# Patient Record
Sex: MALE | Race: ASIAN | Employment: FULL TIME | URBAN - METROPOLITAN AREA
[De-identification: names, ages, dates, MRNs, and addresses within clinical notes are randomized per-mention and may not be internally consistent; named-entity substitution may affect disease eponyms.]

---

## 2017-03-21 RX ORDER — LISINOPRIL 20 MG/1
TABLET ORAL
Qty: 30 TABLET | Refills: 1 | Status: SHIPPED | OUTPATIENT
Start: 2017-03-21 | End: 2017-05-01

## 2017-05-01 ENCOUNTER — OFFICE VISIT (OUTPATIENT)
Dept: INTERNAL MEDICINE CLINIC | Facility: CLINIC | Age: 37
End: 2017-05-01

## 2017-05-01 VITALS
BODY MASS INDEX: 29 KG/M2 | TEMPERATURE: 98 F | WEIGHT: 173.5 LBS | HEART RATE: 61 BPM | DIASTOLIC BLOOD PRESSURE: 80 MMHG | OXYGEN SATURATION: 98 % | SYSTOLIC BLOOD PRESSURE: 116 MMHG

## 2017-05-01 DIAGNOSIS — E78.2 ELEVATED TRIGLYCERIDES WITH HIGH CHOLESTEROL: ICD-10-CM

## 2017-05-01 DIAGNOSIS — J01.80 OTHER ACUTE SINUSITIS, RECURRENCE NOT SPECIFIED: Primary | ICD-10-CM

## 2017-05-01 DIAGNOSIS — I10 ESSENTIAL HYPERTENSION: ICD-10-CM

## 2017-05-01 DIAGNOSIS — E78.00 PURE HYPERCHOLESTEROLEMIA: ICD-10-CM

## 2017-05-01 PROCEDURE — 99214 OFFICE O/P EST MOD 30 MIN: CPT | Performed by: FAMILY MEDICINE

## 2017-05-01 RX ORDER — LISINOPRIL 20 MG/1
TABLET ORAL
Qty: 30 TABLET | Refills: 1 | Status: SHIPPED | OUTPATIENT
Start: 2017-05-01 | End: 2017-06-26

## 2017-05-01 RX ORDER — AZITHROMYCIN 250 MG/1
TABLET, FILM COATED ORAL
Qty: 6 TABLET | Refills: 0 | Status: SHIPPED | OUTPATIENT
Start: 2017-05-01 | End: 2017-07-28 | Stop reason: ALTCHOICE

## 2017-05-01 NOTE — PROGRESS NOTES
CHIEF COMPLAINT:   Patient presents with:  Cough/URI: x 1 week. HPI:   Rafael Arreola is a 40year old male who presents for upper respiratory symptoms for  1 weeks.  Patient reports sore throat, congestion, yellow colored nasal discharge, cough with /80 mmHg  Pulse 61  Temp(Src) 98.3 °F (36.8 °C) (Oral)  Wt 173 lb 8 oz  SpO2 98%  GENERAL: well developed, well nourished,in no apparent distress  SKIN: no rashes,no suspicious lesions  HEAD: atraumatic, normocephalic.   + pressure on palpation of m cool-mist vaporizer and prop themselves up at night to help with the congestion.    - azithromycin (ZITHROMAX Z-LD) 250 MG Oral Tab; Take two tablets by mouth today, then one tablet daily. Dispense: 6 tablet; Refill: 0    4.  Elevated triglycerides with h

## 2017-06-26 ENCOUNTER — TELEPHONE (OUTPATIENT)
Dept: INTERNAL MEDICINE CLINIC | Facility: CLINIC | Age: 37
End: 2017-06-26

## 2017-06-26 DIAGNOSIS — I10 ESSENTIAL HYPERTENSION: ICD-10-CM

## 2017-06-26 RX ORDER — LISINOPRIL 20 MG/1
TABLET ORAL
Qty: 90 TABLET | Refills: 0 | Status: SHIPPED | OUTPATIENT
Start: 2017-06-26 | End: 2017-08-19

## 2017-07-28 ENCOUNTER — OFFICE VISIT (OUTPATIENT)
Dept: INTERNAL MEDICINE CLINIC | Facility: CLINIC | Age: 37
End: 2017-07-28

## 2017-07-28 VITALS
HEIGHT: 64.5 IN | RESPIRATION RATE: 16 BRPM | WEIGHT: 169 LBS | TEMPERATURE: 99 F | HEART RATE: 68 BPM | SYSTOLIC BLOOD PRESSURE: 126 MMHG | BODY MASS INDEX: 28.5 KG/M2 | DIASTOLIC BLOOD PRESSURE: 78 MMHG

## 2017-07-28 DIAGNOSIS — I10 ESSENTIAL HYPERTENSION: Primary | ICD-10-CM

## 2017-07-28 DIAGNOSIS — E78.00 PURE HYPERCHOLESTEROLEMIA: ICD-10-CM

## 2017-07-28 PROCEDURE — 99213 OFFICE O/P EST LOW 20 MIN: CPT | Performed by: FAMILY MEDICINE

## 2017-07-28 NOTE — PROGRESS NOTES
HPI:    Patient ID: Sandy Parra is a 40year old male. HPI  Sandy Parra is a 40year old male. HPI:   Patient presents for recheck of his hypertension.  Pt has been taking medications as instructed, no medication side effects, no home BP geoff Alcohol use: Yes           0.0 - 1.8 oz/week     Standard drinks or equivalent: 0 - 3 per week        REVIEW OF SYSTEMS:   GENERAL HEALTH: feels well otherwise  RESPIRATORY: denies shortness of breath with exertion  CARDIOVASCULAR: denies ch

## 2017-08-19 DIAGNOSIS — I10 ESSENTIAL HYPERTENSION: ICD-10-CM

## 2017-08-21 RX ORDER — LISINOPRIL 20 MG/1
TABLET ORAL
Qty: 90 TABLET | Refills: 0 | Status: SHIPPED | OUTPATIENT
Start: 2017-08-21 | End: 2017-10-26

## 2017-10-26 ENCOUNTER — TELEPHONE (OUTPATIENT)
Dept: INTERNAL MEDICINE CLINIC | Facility: CLINIC | Age: 37
End: 2017-10-26

## 2017-10-26 DIAGNOSIS — I10 ESSENTIAL HYPERTENSION: ICD-10-CM

## 2017-10-26 RX ORDER — LISINOPRIL 20 MG/1
20 TABLET ORAL
Qty: 90 TABLET | Refills: 0 | Status: SHIPPED | OUTPATIENT
Start: 2017-10-26 | End: 2018-02-06

## 2017-12-29 ENCOUNTER — TELEPHONE (OUTPATIENT)
Dept: INTERNAL MEDICINE CLINIC | Facility: CLINIC | Age: 37
End: 2017-12-29

## 2018-01-29 ENCOUNTER — OFFICE VISIT (OUTPATIENT)
Dept: INTERNAL MEDICINE CLINIC | Facility: CLINIC | Age: 38
End: 2018-01-29

## 2018-01-29 VITALS
OXYGEN SATURATION: 98 % | BODY MASS INDEX: 28.96 KG/M2 | RESPIRATION RATE: 16 BRPM | DIASTOLIC BLOOD PRESSURE: 78 MMHG | TEMPERATURE: 99 F | HEIGHT: 64.5 IN | SYSTOLIC BLOOD PRESSURE: 118 MMHG | WEIGHT: 171.75 LBS | HEART RATE: 76 BPM

## 2018-01-29 DIAGNOSIS — E78.2 MIXED HYPERLIPIDEMIA: ICD-10-CM

## 2018-01-29 DIAGNOSIS — F51.01 PRIMARY INSOMNIA: ICD-10-CM

## 2018-01-29 DIAGNOSIS — I10 ESSENTIAL HYPERTENSION: ICD-10-CM

## 2018-01-29 DIAGNOSIS — J06.9 VIRAL UPPER RESPIRATORY TRACT INFECTION: Primary | ICD-10-CM

## 2018-01-29 PROCEDURE — 99214 OFFICE O/P EST MOD 30 MIN: CPT | Performed by: INTERNAL MEDICINE

## 2018-01-29 RX ORDER — BENZONATATE 100 MG/1
100 CAPSULE ORAL 3 TIMES DAILY PRN
Qty: 30 CAPSULE | Refills: 1 | Status: SHIPPED | OUTPATIENT
Start: 2018-01-29 | End: 2018-04-04 | Stop reason: ALTCHOICE

## 2018-01-30 PROBLEM — F51.01 PRIMARY INSOMNIA: Status: ACTIVE | Noted: 2018-01-30

## 2018-01-30 NOTE — PROGRESS NOTES
Wynetta Spurling is a 41year old male. HPI:   Patient presents with:  Cough: Est Pt. c/c cough and lack of sleep  Patient presents with persistent cough. Persisting on and off for past several weeks.     Has been taking OTC medications with minimal rel smokeless tobacco. He reports that he drinks alcohol. He reports that he uses drugs, including Cannabis.   Wt Readings from Last 6 Encounters:  01/29/18 : 171 lb 12 oz  07/28/17 : 169 lb  05/01/17 : 173 lb 8 oz  12/08/16 : 175 lb  11/10/16 : 177 lb  10/06/1

## 2018-01-30 NOTE — PATIENT INSTRUCTIONS
- Start cough suppressant capsules (benzonatate). Take 1 capsule every 8 hours as needed for cough  - Start nightly melatonin for sleep. Take at least 1-2 mg every night for the next few weeks. Let us know if this does not help with your sleep.   - Get b

## 2018-02-06 ENCOUNTER — TELEPHONE (OUTPATIENT)
Dept: INTERNAL MEDICINE CLINIC | Facility: CLINIC | Age: 38
End: 2018-02-06

## 2018-02-06 DIAGNOSIS — I10 ESSENTIAL HYPERTENSION: ICD-10-CM

## 2018-02-06 RX ORDER — LISINOPRIL 20 MG/1
20 TABLET ORAL DAILY
Qty: 90 TABLET | Refills: 1 | Status: SHIPPED | OUTPATIENT
Start: 2018-02-06 | End: 2018-10-15

## 2018-04-02 ENCOUNTER — APPOINTMENT (OUTPATIENT)
Dept: LAB | Age: 38
End: 2018-04-02
Attending: FAMILY MEDICINE
Payer: COMMERCIAL

## 2018-04-02 DIAGNOSIS — E78.00 PURE HYPERCHOLESTEROLEMIA: ICD-10-CM

## 2018-04-02 DIAGNOSIS — E78.2 ELEVATED TRIGLYCERIDES WITH HIGH CHOLESTEROL: ICD-10-CM

## 2018-04-02 DIAGNOSIS — I10 ESSENTIAL HYPERTENSION: ICD-10-CM

## 2018-04-02 PROCEDURE — 83036 HEMOGLOBIN GLYCOSYLATED A1C: CPT

## 2018-04-02 PROCEDURE — 80061 LIPID PANEL: CPT

## 2018-04-02 PROCEDURE — 80053 COMPREHEN METABOLIC PANEL: CPT

## 2018-04-02 PROCEDURE — 36415 COLL VENOUS BLD VENIPUNCTURE: CPT

## 2018-04-04 ENCOUNTER — OFFICE VISIT (OUTPATIENT)
Dept: INTERNAL MEDICINE CLINIC | Facility: CLINIC | Age: 38
End: 2018-04-04

## 2018-04-04 VITALS
BODY MASS INDEX: 28.84 KG/M2 | HEIGHT: 64.75 IN | HEART RATE: 78 BPM | SYSTOLIC BLOOD PRESSURE: 118 MMHG | TEMPERATURE: 98 F | WEIGHT: 171 LBS | RESPIRATION RATE: 16 BRPM | OXYGEN SATURATION: 99 % | DIASTOLIC BLOOD PRESSURE: 68 MMHG

## 2018-04-04 DIAGNOSIS — F51.01 PRIMARY INSOMNIA: ICD-10-CM

## 2018-04-04 DIAGNOSIS — Z00.00 LABORATORY EXAM ORDERED AS PART OF ROUTINE GENERAL MEDICAL EXAMINATION: ICD-10-CM

## 2018-04-04 DIAGNOSIS — R07.9 CHEST PAIN, UNSPECIFIED TYPE: ICD-10-CM

## 2018-04-04 DIAGNOSIS — Z00.00 WELLNESS EXAMINATION: Primary | ICD-10-CM

## 2018-04-04 PROCEDURE — 99213 OFFICE O/P EST LOW 20 MIN: CPT | Performed by: FAMILY MEDICINE

## 2018-04-04 PROCEDURE — 99395 PREV VISIT EST AGE 18-39: CPT | Performed by: FAMILY MEDICINE

## 2018-04-04 RX ORDER — TEMAZEPAM 15 MG/1
15 CAPSULE ORAL NIGHTLY PRN
Qty: 30 CAPSULE | Refills: 0 | Status: SHIPPED | OUTPATIENT
Start: 2018-04-04 | End: 2018-04-24

## 2018-04-04 NOTE — PROGRESS NOTES
HPI:    Patient ID: Ezio Officer is a 41year old male.     HPI  Ezio Officer is a 41year old male who presents for a complete physical exam.   HPI:       Wt Readings from Last 6 Encounters:  04/04/18 : 171 lb  01/29/18 : 171 lb 12 oz  07/28/17 : 169 Years: 0.00      Smokeless tobacco: Never Used                      Alcohol use: Yes           0.0 - 1.8 oz/week     Standard drinks or equivalent: 0 - 3 per week     .    Exercise: minimal.  Diet: watches minimally     REVIEW OF SYSTEMS:   GENERAL: feels w Silvino Gorodn is a 41year old male who presents for a complete physical exam.  Discussed his sleep  Suspect more anxiety related   Will try restoril prn   Discussed his CP   Suspect stress realted as well    Ok to exercise   Not cardiac    Discussed h

## 2018-04-24 ENCOUNTER — OFFICE VISIT (OUTPATIENT)
Dept: INTERNAL MEDICINE CLINIC | Facility: CLINIC | Age: 38
End: 2018-04-24

## 2018-04-24 VITALS
HEART RATE: 68 BPM | OXYGEN SATURATION: 99 % | DIASTOLIC BLOOD PRESSURE: 80 MMHG | TEMPERATURE: 99 F | BODY MASS INDEX: 29 KG/M2 | WEIGHT: 173 LBS | SYSTOLIC BLOOD PRESSURE: 126 MMHG

## 2018-04-24 DIAGNOSIS — G47.00 INSOMNIA, UNSPECIFIED TYPE: ICD-10-CM

## 2018-04-24 DIAGNOSIS — H04.123 CHRONIC DRYNESS OF BOTH EYES: ICD-10-CM

## 2018-04-24 DIAGNOSIS — R06.83 SNORING: Primary | ICD-10-CM

## 2018-04-24 PROCEDURE — 99213 OFFICE O/P EST LOW 20 MIN: CPT | Performed by: NURSE PRACTITIONER

## 2018-04-24 NOTE — PROGRESS NOTES
CHIEF COMPLAINT:     Patient presents with:  Eye Pain: Bilateral eye pain intermittent x 5 years associated with dry, \"numb, and burning\" eyes in the morning.        HPI:   Vanessa Clark is a 41year old male who presents with bilateral eye pain and swe vision, + intermittent blurred vision, gritty sensation, chronic red eyes, some light sensitivity   HENT: Denies congestion, rhinorrhea, sore throat or ear pain  CHEST: Denies chest pain, or palpitations  LUNGS: Denies shortness of breath, cough, or wheezi improvement in symptoms.

## 2018-05-24 RX ORDER — LISINOPRIL 20 MG/1
TABLET ORAL
Qty: 30 TABLET | Refills: 0 | Status: SHIPPED | OUTPATIENT
Start: 2018-05-24 | End: 2019-01-17

## 2018-05-24 NOTE — TELEPHONE ENCOUNTER
Last filled to [facility] 2/7/18 #90 with 1 refill. Pt has not received mail order Rx and is out of medication.  Would like small amount to get him through till order arrives

## 2018-06-13 ENCOUNTER — OFFICE VISIT (OUTPATIENT)
Dept: PULMONOLOGY | Facility: CLINIC | Age: 38
End: 2018-06-13

## 2018-06-13 VITALS
RESPIRATION RATE: 18 BRPM | HEART RATE: 64 BPM | OXYGEN SATURATION: 98 % | BODY MASS INDEX: 28.28 KG/M2 | DIASTOLIC BLOOD PRESSURE: 75 MMHG | SYSTOLIC BLOOD PRESSURE: 121 MMHG | HEIGHT: 65.5 IN | WEIGHT: 171.81 LBS

## 2018-06-13 DIAGNOSIS — G47.00 INSOMNIA, UNSPECIFIED TYPE: ICD-10-CM

## 2018-06-13 DIAGNOSIS — G47.33 OSA (OBSTRUCTIVE SLEEP APNEA): Primary | ICD-10-CM

## 2018-06-13 PROCEDURE — 99212 OFFICE O/P EST SF 10 MIN: CPT | Performed by: INTERNAL MEDICINE

## 2018-06-13 PROCEDURE — 99244 OFF/OP CNSLTJ NEW/EST MOD 40: CPT | Performed by: INTERNAL MEDICINE

## 2018-06-13 NOTE — PATIENT INSTRUCTIONS
Please await a call from Nicolas Lao regarding authorization of your sleep study, if you do not hear from them after 2 business days you can call them directly at [phone] to check status.

## 2018-06-13 NOTE — PROGRESS NOTES
[facility] OFFICE BUILDING, [facility], [facility]    Report of Consultation    Wynetta Spurling Patient Status:  Outpatient    1/10/1980 MRN [id number]   Location [facility] BUILDING, [address] [place] [address], [facility] (Not in a hospital admission)    Allergies  No Known Allergies    Review of Systems:    Pertinent items are noted in HPI. Physical Exam:   Blood pressure 121/75, pulse 64, resp.  rate 18, height 5' 5.5\" (1.664 m), weight 171 lb 12.8 oz (77.9 kg), Sp

## 2018-06-29 ENCOUNTER — TELEPHONE (OUTPATIENT)
Dept: PULMONOLOGY | Facility: CLINIC | Age: 38
End: 2018-06-29

## 2018-06-29 DIAGNOSIS — G47.33 OSA (OBSTRUCTIVE SLEEP APNEA): Primary | ICD-10-CM

## 2018-06-29 NOTE — TELEPHONE ENCOUNTER
Insurance denying in lab sleep study, AR if you are ok w/ home sleep study sign the pended order. Thanks.

## 2018-07-02 NOTE — TELEPHONE ENCOUNTER
Dr. Roberto England signed referral for home sleep study, message routed to [facility] to double check approval and contact patient re: authorization.

## 2018-08-07 ENCOUNTER — OFFICE VISIT (OUTPATIENT)
Dept: SLEEP CENTER | Age: 38
End: 2018-08-07
Attending: INTERNAL MEDICINE
Payer: COMMERCIAL

## 2018-08-07 DIAGNOSIS — G47.33 OSA (OBSTRUCTIVE SLEEP APNEA): ICD-10-CM

## 2018-08-07 PROCEDURE — 95806 SLEEP STUDY UNATT&RESP EFFT: CPT

## 2018-08-09 NOTE — PROCEDURES
[facility]  Accredited by the [employer] ([facility])    PATIENT'S NAME: Tigre Sheth   ATTENDING PHYSICIAN: Katie Cornejo Arm, MD   REFERRING PHYSICIAN: Katie Cornejo Arm, MD   PATIENT ACCOUNT #: [de-identified] LOCATION: [facility] CPAP titration in this symptomatic patient. Alternatively, as the apnea plus hypopnea index is only mildly elevated, could consider oral appliance therapy. 2.   Weight loss. 3.   Avoid alcohol. 4.   Avoid sedating drug.   5.   Patient should not drive

## 2018-08-24 ENCOUNTER — TELEPHONE (OUTPATIENT)
Dept: PULMONOLOGY | Facility: CLINIC | Age: 38
End: 2018-08-24

## 2018-08-24 DIAGNOSIS — G47.33 OSA (OBSTRUCTIVE SLEEP APNEA): Primary | ICD-10-CM

## 2018-08-24 NOTE — TELEPHONE ENCOUNTER
Pt insurance will allow for autotpap machine. However Pt wants to know of alternatives other than cpap therapy for the mild LEROY. Pt is not interested in wearing a cpap mask. Pt wants to know if loosing weight is an option. Please advise.

## 2018-08-24 NOTE — TELEPHONE ENCOUNTER
----- Message from Kiran Armenta MD sent at 8/21/2018  1:28 PM CDT -----  Please inform the patient that his sleep study showed mild LEROY  Referred the patient for CPAP titration sleep study

## 2018-08-28 NOTE — TELEPHONE ENCOUNTER
Sleep study showed mild obstructive sleep apnea  I think it is reasonable to refer the patient for oral appliances if he is not willing to use a CPAP  Please refer patient for oral appliances

## 2018-08-29 NOTE — TELEPHONE ENCOUNTER
Pt informed of below. Pt stts he wants to try the auto cpap machine first. Dr Mundo Bishop please place the order for the auto pap machine. Thank you.

## 2018-09-07 NOTE — TELEPHONE ENCOUNTER
Spoke with pt informed him auto cpap order will be sent to [facility]. Pt was provided with number and also informed to F/U with  30-90 days prior to starting cpap.  Pt stated he will cancel upcoming magda for 9/19/18 and will make magda cpap F/U once he star

## 2018-09-10 NOTE — PROGRESS NOTES
Patient presents with: Anxiety: since Friday (9/7/18). Insomnia: since Friday (9/7/18). Headache: intense headache this morning. Saturday c/o headache (9/8/18). Cold chills, no appetite, not eating, tired since Friday (9/7/18).         HPI:  The patien Medications:  DiphenhydrAMINE HCl (ALLERGY MED OR) Take 1 tablet by mouth daily. Disp:  Rfl:    ALPRAZolam (XANAX) 0.25 MG Oral Tab Take 1 tablet (0.25 mg total) by mouth nightly as needed for Sleep.  Disp: 30 tablet Rfl: 0   LISINOPRIL 20 MG Oral Tab TAKE Disp Refills   • ALPRAZolam (XANAX) 0.25 MG Oral Tab 30 tablet 0     Sig: Take 1 tablet (0.25 mg total) by mouth nightly as needed for Sleep. Imaging & Consults:  None    Anxiety- no suicidal or homicidal thoughts.   Long discussion about pathophysiol

## 2018-10-10 ENCOUNTER — TELEPHONE (OUTPATIENT)
Dept: INTERNAL MEDICINE CLINIC | Facility: CLINIC | Age: 38
End: 2018-10-10

## 2018-10-10 RX ORDER — ALPRAZOLAM 0.25 MG/1
0.25 TABLET ORAL NIGHTLY PRN
Qty: 30 TABLET | Refills: 0 | Status: CANCELLED | OUTPATIENT
Start: 2018-10-10

## 2018-10-10 NOTE — TELEPHONE ENCOUNTER
Has upcoming appt on 10/15/18 with SM Will run out of alprazolam before appt.  Medication helped him with sleep and requesting refill or alternative med if unable to be refilled Will discuss at [address] ,Taking half tablet now trying to wean off but also started

## 2018-10-10 NOTE — TELEPHONE ENCOUNTER
Pt called for call back from nurse to discuss refill of ALPRAZolam (XANAX) 0.25 MG Oral Tab. Please call to discuss.

## 2018-10-12 RX ORDER — ALPRAZOLAM 0.25 MG/1
0.25 TABLET ORAL NIGHTLY PRN
Qty: 30 TABLET | Refills: 0 | Status: SHIPPED | OUTPATIENT
Start: 2018-10-12 | End: 2018-10-15

## 2018-10-12 NOTE — TELEPHONE ENCOUNTER
Ok to refill xanax 0.25 mg one nightly at night but I am not in until Monday to refill the medication unless Gisel Caass can refill it for him since he has an appt on 10/15/18

## 2018-10-15 ENCOUNTER — OFFICE VISIT (OUTPATIENT)
Dept: INTERNAL MEDICINE CLINIC | Facility: CLINIC | Age: 38
End: 2018-10-15
Payer: COMMERCIAL

## 2018-10-15 VITALS
DIASTOLIC BLOOD PRESSURE: 74 MMHG | WEIGHT: 167.5 LBS | RESPIRATION RATE: 12 BRPM | TEMPERATURE: 99 F | SYSTOLIC BLOOD PRESSURE: 118 MMHG | BODY MASS INDEX: 27 KG/M2 | HEART RATE: 74 BPM | OXYGEN SATURATION: 98 %

## 2018-10-15 DIAGNOSIS — E78.00 PURE HYPERCHOLESTEROLEMIA: ICD-10-CM

## 2018-10-15 DIAGNOSIS — F51.01 PRIMARY INSOMNIA: ICD-10-CM

## 2018-10-15 DIAGNOSIS — F41.9 ANXIETY: ICD-10-CM

## 2018-10-15 DIAGNOSIS — I10 ESSENTIAL HYPERTENSION: Primary | ICD-10-CM

## 2018-10-15 PROCEDURE — 99214 OFFICE O/P EST MOD 30 MIN: CPT | Performed by: FAMILY MEDICINE

## 2018-10-15 RX ORDER — LISINOPRIL 20 MG/1
20 TABLET ORAL DAILY
Qty: 90 TABLET | Refills: 0 | Status: SHIPPED | OUTPATIENT
Start: 2018-10-15 | End: 2019-01-17

## 2018-10-15 RX ORDER — LORATADINE 10 MG/1
10 TABLET ORAL DAILY
COMMUNITY

## 2018-10-15 RX ORDER — ALPRAZOLAM 0.25 MG/1
0.25 TABLET ORAL NIGHTLY PRN
Qty: 30 TABLET | Refills: 0 | Status: SHIPPED | OUTPATIENT
Start: 2018-10-15 | End: 2019-01-17

## 2018-10-15 NOTE — PROGRESS NOTES
Patient presents with:  Medication Follow-Up      HPI:  Pt is here for a recheck of anxiety. Has been tolerating the meds well. Denies any side effects from the meds. Mood has been good. Patient's appetite is good. Pt denies headaches,tics,or insomnia.  Pt h Outpatient Medications:  loratadine 10 MG Oral Tab Take 10 mg by mouth daily. Disp:  Rfl:    lisinopril 20 MG Oral Tab Take 1 tablet (20 mg total) by mouth daily.  Disp: 90 tablet Rfl: 0   ALPRAZolam (XANAX) 0.25 MG Oral Tab Take 1 tablet (0.25 mg total) by Keiko Menjivar) 0.25 MG Oral Tab 30 tablet 0     Sig: Take 1 tablet (0.25 mg total) by mouth nightly as needed for Sleep. Imaging & Consults:  None    1. Essential hypertension  Conservative measures dicussed. Diet and exercise explained and encouraged.   Fa

## 2018-10-16 ENCOUNTER — TELEPHONE (OUTPATIENT)
Dept: PULMONOLOGY | Facility: CLINIC | Age: 38
End: 2018-10-16

## 2018-10-16 DIAGNOSIS — G47.33 OSA (OBSTRUCTIVE SLEEP APNEA): Primary | ICD-10-CM

## 2018-10-17 NOTE — TELEPHONE ENCOUNTER
Pt states he does not want to use the CPAP machine as he is not comfortable using it. Pt would like referral for oral appliance. Dr. Selam Guido, ok to D/C CPAP machine and refer to [address] [place] [address] for oral appliance?

## 2018-10-18 NOTE — TELEPHONE ENCOUNTER
Referral to [address] [place] [address] faxed to [phone]. Fax confirmation received. [facility] of [facility] phone # [phone]. Referral sent to HIM for scanning.

## 2018-10-19 NOTE — TELEPHONE ENCOUNTER
D/C CPAP order faxed to [facility] [phone] and left for Yo Cali [facility] to . Fax confirmation received.

## 2018-11-01 ENCOUNTER — TELEPHONE (OUTPATIENT)
Dept: INTERNAL MEDICINE CLINIC | Facility: CLINIC | Age: 38
End: 2018-11-01

## 2018-11-01 NOTE — TELEPHONE ENCOUNTER
Received a  from the [address] [place] [address] for treatment and evaluation for patient. Form was faxed to our Maradama Castromann referral  department.

## 2018-11-20 ENCOUNTER — LAB ENCOUNTER (OUTPATIENT)
Dept: LAB | Age: 38
End: 2018-11-20
Attending: FAMILY MEDICINE
Payer: COMMERCIAL

## 2018-11-20 ENCOUNTER — TELEPHONE (OUTPATIENT)
Dept: INTERNAL MEDICINE CLINIC | Facility: CLINIC | Age: 38
End: 2018-11-20

## 2018-11-20 ENCOUNTER — OFFICE VISIT (OUTPATIENT)
Dept: INTERNAL MEDICINE CLINIC | Facility: CLINIC | Age: 38
End: 2018-11-20
Payer: COMMERCIAL

## 2018-11-20 VITALS
TEMPERATURE: 99 F | DIASTOLIC BLOOD PRESSURE: 82 MMHG | WEIGHT: 166.75 LBS | HEART RATE: 67 BPM | SYSTOLIC BLOOD PRESSURE: 130 MMHG | BODY MASS INDEX: 27 KG/M2 | OXYGEN SATURATION: 98 %

## 2018-11-20 DIAGNOSIS — I10 ESSENTIAL HYPERTENSION: ICD-10-CM

## 2018-11-20 DIAGNOSIS — R68.83 CHILLS: ICD-10-CM

## 2018-11-20 DIAGNOSIS — E78.00 PURE HYPERCHOLESTEROLEMIA: ICD-10-CM

## 2018-11-20 DIAGNOSIS — R25.3 EYELID TWITCH: ICD-10-CM

## 2018-11-20 DIAGNOSIS — R68.83 CHILLS: Primary | ICD-10-CM

## 2018-11-20 DIAGNOSIS — H02.823 EYELID CYST, RIGHT: ICD-10-CM

## 2018-11-20 DIAGNOSIS — G47.33 OSA (OBSTRUCTIVE SLEEP APNEA): Primary | ICD-10-CM

## 2018-11-20 DIAGNOSIS — B34.9 VIRAL ILLNESS: ICD-10-CM

## 2018-11-20 PROCEDURE — 80053 COMPREHEN METABOLIC PANEL: CPT

## 2018-11-20 PROCEDURE — 99214 OFFICE O/P EST MOD 30 MIN: CPT | Performed by: FAMILY MEDICINE

## 2018-11-20 PROCEDURE — 36415 COLL VENOUS BLD VENIPUNCTURE: CPT

## 2018-11-20 PROCEDURE — 84443 ASSAY THYROID STIM HORMONE: CPT

## 2018-11-20 PROCEDURE — 80061 LIPID PANEL: CPT

## 2018-11-20 PROCEDURE — 84439 ASSAY OF FREE THYROXINE: CPT

## 2018-11-20 PROCEDURE — 85025 COMPLETE CBC W/AUTO DIFF WBC: CPT

## 2018-11-20 NOTE — TELEPHONE ENCOUNTER
Referral request   Received:  Today   Message Contents   Sherif Olvera [facility] Clinical Staff   Cc: [facility]   Phone Number: [phone]             Patient Name: Rafael Arreola   : 01/10/1980   Reason for the order/referral:

## 2018-11-20 NOTE — PROGRESS NOTES
CHIEF COMPLAINT:     Patient presents with:  Cold: has been feeling cold since yesterday. Eye Twitching: Intermittent right eye twitching x 1 week. HPI:   Gaston Ellis is a 41year old male .   Pt has been feeling cold and having chills for the p congestion, rhinorrhea, sore throat or ear pain  CHEST: Denies chest pain, or palpitations  LUNGS: Denies shortness of breath, cough, or wheezing  GI: Denies abdominal pain, N/V/C/D.   MUSCULOSKELETAL: no arthralgia or swollen joints  LYMPH:  Denies lympha plan.  The patient is asked to call with any concerns.

## 2018-11-21 ENCOUNTER — TELEPHONE (OUTPATIENT)
Dept: INTERNAL MEDICINE CLINIC | Facility: CLINIC | Age: 38
End: 2018-11-21

## 2018-11-21 DIAGNOSIS — G24.5 EYE TWITCH: ICD-10-CM

## 2018-11-21 DIAGNOSIS — H02.823 EYELID CYST, RIGHT: Primary | ICD-10-CM

## 2018-11-21 NOTE — TELEPHONE ENCOUNTER
referral   Received:  Today   Message Contents   Izabel Senior [facility] Clinical Staff   Cc: [facility] Referral Pool   Phone Number: [phone]             .Reason for the order/referral: office visit   PCP:  Dr Rhoda De Santiago   Refer to Provider (first a

## 2018-11-28 DIAGNOSIS — E78.00 PURE HYPERCHOLESTEROLEMIA: Primary | ICD-10-CM

## 2018-11-28 DIAGNOSIS — R79.89 ABNORMAL TSH: ICD-10-CM

## 2018-11-28 RX ORDER — ATORVASTATIN CALCIUM 20 MG/1
20 TABLET, FILM COATED ORAL NIGHTLY
Qty: 30 TABLET | Refills: 5 | Status: SHIPPED | OUTPATIENT
Start: 2018-11-28 | End: 2019-01-17

## 2018-11-29 ENCOUNTER — TELEPHONE (OUTPATIENT)
Dept: INTERNAL MEDICINE CLINIC | Facility: CLINIC | Age: 38
End: 2018-11-29

## 2018-11-29 NOTE — TELEPHONE ENCOUNTER
Patient is returning the nurses call in regards to the referral the sleep apnea equipment. Please advise.

## 2018-11-29 NOTE — TELEPHONE ENCOUNTER
Patient calling to be sure referral is authorized for Dr Apurva Mckeon for sleep apnea equipment; reviewed all of referral information with him and he asked a nurse call him to be sure it is authorized as he was waiting for a period of time for authorization.  Ple

## 2018-12-14 ENCOUNTER — TELEPHONE (OUTPATIENT)
Dept: INTERNAL MEDICINE CLINIC | Facility: CLINIC | Age: 38
End: 2018-12-14

## 2018-12-14 DIAGNOSIS — H02.823 CYST OF RIGHT EYELID: Primary | ICD-10-CM

## 2018-12-14 DIAGNOSIS — G24.5 EYE TWITCH: ICD-10-CM

## 2018-12-14 NOTE — TELEPHONE ENCOUNTER
Referal request   Received:  Today   Message Contents   Jermaine Hutchinson 18 Clinical Staff   Cc: [facility]   Phone Number: [phone]             Patient Name: Sandy Parra   : [birth year]   Reason for the order/referral: F/u

## 2018-12-17 NOTE — TELEPHONE ENCOUNTER
Spoke with pt and Dr Rock Reddy feels cyst on R eye lid may need surgery to be removed and that is something he does not do So he needs referral to ophthalmologist who can surgically remove cyst if needed

## 2019-01-17 ENCOUNTER — OFFICE VISIT (OUTPATIENT)
Dept: INTERNAL MEDICINE CLINIC | Facility: CLINIC | Age: 39
End: 2019-01-17
Payer: COMMERCIAL

## 2019-01-17 ENCOUNTER — TELEPHONE (OUTPATIENT)
Dept: INTERNAL MEDICINE CLINIC | Facility: CLINIC | Age: 39
End: 2019-01-17

## 2019-01-17 ENCOUNTER — APPOINTMENT (OUTPATIENT)
Dept: LAB | Age: 39
End: 2019-01-17
Attending: FAMILY MEDICINE
Payer: COMMERCIAL

## 2019-01-17 VITALS
SYSTOLIC BLOOD PRESSURE: 128 MMHG | WEIGHT: 174 LBS | OXYGEN SATURATION: 98 % | DIASTOLIC BLOOD PRESSURE: 80 MMHG | TEMPERATURE: 99 F | HEART RATE: 78 BPM | HEIGHT: 65 IN | BODY MASS INDEX: 28.99 KG/M2 | RESPIRATION RATE: 16 BRPM

## 2019-01-17 DIAGNOSIS — R79.89 ABNORMAL THYROID STIMULATING HORMONE LEVEL: ICD-10-CM

## 2019-01-17 DIAGNOSIS — E78.2 MIXED HYPERLIPIDEMIA: ICD-10-CM

## 2019-01-17 DIAGNOSIS — R79.89 ABNORMAL THYROID BLOOD TEST: ICD-10-CM

## 2019-01-17 DIAGNOSIS — F41.9 ANXIETY: ICD-10-CM

## 2019-01-17 DIAGNOSIS — E05.00 THYROID ASSOCIATED OPHTHALMOPATHY: Primary | ICD-10-CM

## 2019-01-17 DIAGNOSIS — R79.89 ABNORMAL TSH: ICD-10-CM

## 2019-01-17 DIAGNOSIS — R79.89 ABNORMAL SERUM THYROXINE (T4) LEVEL: ICD-10-CM

## 2019-01-17 DIAGNOSIS — I10 ESSENTIAL HYPERTENSION: ICD-10-CM

## 2019-01-17 DIAGNOSIS — L65.9 HAIR THINNING: Primary | ICD-10-CM

## 2019-01-17 LAB
T4 FREE SERPL-MCNC: 0.8 NG/DL (ref 0.9–1.8)
TSI SER-ACNC: 0.8 MIU/ML (ref 0.35–5.5)

## 2019-01-17 PROCEDURE — 84443 ASSAY THYROID STIM HORMONE: CPT

## 2019-01-17 PROCEDURE — 36415 COLL VENOUS BLD VENIPUNCTURE: CPT

## 2019-01-17 PROCEDURE — 99214 OFFICE O/P EST MOD 30 MIN: CPT | Performed by: FAMILY MEDICINE

## 2019-01-17 PROCEDURE — 84439 ASSAY OF FREE THYROXINE: CPT

## 2019-01-17 RX ORDER — LISINOPRIL 20 MG/1
20 TABLET ORAL DAILY
Qty: 90 TABLET | Refills: 1 | Status: SHIPPED | OUTPATIENT
Start: 2019-01-17 | End: 2019-07-18

## 2019-01-17 RX ORDER — ALPRAZOLAM 0.25 MG/1
0.25 TABLET ORAL NIGHTLY PRN
Qty: 30 TABLET | Refills: 0 | Status: SHIPPED | OUTPATIENT
Start: 2019-01-17 | End: 2019-08-01

## 2019-01-17 RX ORDER — ATORVASTATIN CALCIUM 20 MG/1
20 TABLET, FILM COATED ORAL NIGHTLY
Qty: 90 TABLET | Refills: 1 | Status: SHIPPED | OUTPATIENT
Start: 2019-01-17 | End: 2019-06-05

## 2019-01-17 NOTE — TELEPHONE ENCOUNTER
Please let Pt know his CT of the orbits is ordered but has to be approved? ?  But I'm not sure, please check this out .  ( see progress note)

## 2019-01-17 NOTE — TELEPHONE ENCOUNTER
Yasmin Caceres 13 talked to [facility] to get Diagnoses and if the DrYoanna Needed Ct scan with or without contrast.     She talked to the Dr. Tena Rai and gave me ICD 10 code of E05.00    Ct is needed with and without contrast, so both.

## 2019-01-17 NOTE — PROGRESS NOTES
CHIEF COMPLAINT:     Patient presents with:  Referral: Needs Ref. for Ct scan Pt say Opthomologist Might have thyroid eye disease  Medication Request: needs to talk to doc. about 2 different meds. he researched.       HPI:   Diane Suárez is a 46year old External Cream Apply 1 Application topically 2 (two) times daily as needed.  Disp: 60 g Rfl: 0      Past Medical History:   Diagnosis Date   • Chest pain Oct 2016    nl cath   • Hyperlipidemia       Social History:  Social History    Tobacco Use      Smokin hyperlipidemia  Abnormal thyroid blood test    Orders Placed This Encounter      T4 FREE [E] (Free Thyroxine)      Meds & Refills for this Visit:  Requested Prescriptions     Signed Prescriptions Disp Refills   • lisinopril 20 MG Oral Tab 90 tablet 1     S

## 2019-01-22 NOTE — TELEPHONE ENCOUNTER
Message   Received: 5 days ago   Message Contents   STACY Phoenix  P [facility] Clinical Staff             TSh wnl . Free T 4 still a little low.  Lets have Pt f/u with Endo for a further eval.( Dr. Kim Gutiérrez )

## 2019-01-22 NOTE — TELEPHONE ENCOUNTER
So do they want him to see Neuro? I don't understand their request. He did not see Neuro he saw Optho and they are the one who want it. If they are not going to approve it, then I will send him to Endo instead.

## 2019-01-22 NOTE — TELEPHONE ENCOUNTER
NEUROLOGICAL EXAM REQUIRED/[facility] E[facility]   Received:  Today   Message Contents   Cheryle Mckeon [facility] Clinical Staff      Gege Elias   Service Ref #: [id number]   Member ID: [id number]   Group #: [id number]       Dear Ryan Holder:     On 1/21/2019, Coverage for services is subject to the terms and conditions of your health benefit plan including exclusions, limitations, conditions and patient eligibility.    You are responsible for deductibles, coinsurance, copayments and items not covered by the dorina

## 2019-01-28 ENCOUNTER — TELEPHONE (OUTPATIENT)
Dept: INTERNAL MEDICINE CLINIC | Facility: CLINIC | Age: 39
End: 2019-01-28

## 2019-01-28 NOTE — TELEPHONE ENCOUNTER
Informed that MA noted he needs an appt and we scheduled for his [facility] referral in early FEB with Dr Breanne Hurd

## 2019-01-28 NOTE — TELEPHONE ENCOUNTER
LVM for patient to call office and make appt to fill out referral request from [address] [place] [address].      Copy of form is in Upcoming Appts Folder on [facility]

## 2019-02-01 ENCOUNTER — TELEPHONE (OUTPATIENT)
Dept: INTERNAL MEDICINE CLINIC | Facility: CLINIC | Age: 39
End: 2019-02-01

## 2019-02-01 NOTE — TELEPHONE ENCOUNTER
Tried to reach out to patient @ [phone] to inform him that the CT orbits ordered by Dr Apolinar Cuellar was authorized by [facility] and he has until 03/15/2019 to get it done. Please provide ph# [phone] for scheduling.

## 2019-02-09 ENCOUNTER — OFFICE VISIT (OUTPATIENT)
Dept: INTERNAL MEDICINE CLINIC | Facility: CLINIC | Age: 39
End: 2019-02-09
Payer: COMMERCIAL

## 2019-02-09 ENCOUNTER — HOSPITAL ENCOUNTER (OUTPATIENT)
Dept: CT IMAGING | Facility: HOSPITAL | Age: 39
Discharge: HOME | End: 2019-02-09
Attending: FAMILY MEDICINE
Payer: COMMERCIAL

## 2019-02-09 VITALS
OXYGEN SATURATION: 99 % | HEIGHT: 65 IN | WEIGHT: 176 LBS | BODY MASS INDEX: 29.32 KG/M2 | HEART RATE: 84 BPM | DIASTOLIC BLOOD PRESSURE: 74 MMHG | SYSTOLIC BLOOD PRESSURE: 120 MMHG | TEMPERATURE: 98 F | RESPIRATION RATE: 16 BRPM

## 2019-02-09 DIAGNOSIS — G47.00 INSOMNIA, UNSPECIFIED TYPE: Primary | ICD-10-CM

## 2019-02-09 DIAGNOSIS — G47.33 OSA (OBSTRUCTIVE SLEEP APNEA): ICD-10-CM

## 2019-02-09 DIAGNOSIS — E05.00 THYROID ASSOCIATED OPHTHALMOPATHY: ICD-10-CM

## 2019-02-09 LAB — CREAT SERPL-MCNC: 1 MG/DL (ref 0.7–1.3)

## 2019-02-09 PROCEDURE — 70482 CT ORBIT/EAR/FOSSA W/O&W/DYE: CPT | Performed by: FAMILY MEDICINE

## 2019-02-09 PROCEDURE — 99213 OFFICE O/P EST LOW 20 MIN: CPT | Performed by: FAMILY MEDICINE

## 2019-02-09 PROCEDURE — 82565 ASSAY OF CREATININE: CPT

## 2019-02-09 NOTE — PROGRESS NOTES
HPI:    Patient ID: Roberta Baker is a 46year old male.     HPI  Here today to get eval for oral appliance to treat his LEROY    He has had sleep study w mild LEROY   Did not want CPAP   The provider at the sleep ctr thought ok to try oral appliance   They r

## 2019-02-13 NOTE — TELEPHONE ENCOUNTER
Patient calling in, stated the [facility] stated they need the [address] [place] [address] office to place an order for the CT electronically, prior to pt scheduling an appointment.

## 2019-02-14 NOTE — TELEPHONE ENCOUNTER
I told him at [facility]    I will not order tests for a dentist, never seen by them yet either        Please see my note   Can we refer to the [facility] for a consultation?

## 2019-02-25 PROCEDURE — 36415 COLL VENOUS BLD VENIPUNCTURE: CPT | Performed by: INTERNAL MEDICINE

## 2019-02-25 PROCEDURE — 84445 ASSAY OF TSI GLOBULIN: CPT | Performed by: INTERNAL MEDICINE

## 2019-02-25 PROCEDURE — 84439 ASSAY OF FREE THYROXINE: CPT | Performed by: INTERNAL MEDICINE

## 2019-03-02 ENCOUNTER — OFFICE VISIT (OUTPATIENT)
Dept: INTERNAL MEDICINE CLINIC | Facility: CLINIC | Age: 39
End: 2019-03-02

## 2019-03-02 VITALS
DIASTOLIC BLOOD PRESSURE: 82 MMHG | OXYGEN SATURATION: 98 % | WEIGHT: 177 LBS | BODY MASS INDEX: 29 KG/M2 | SYSTOLIC BLOOD PRESSURE: 122 MMHG | TEMPERATURE: 98 F | HEART RATE: 84 BPM

## 2019-03-02 DIAGNOSIS — E78.2 ELEVATED TRIGLYCERIDES WITH HIGH CHOLESTEROL: ICD-10-CM

## 2019-03-02 DIAGNOSIS — M79.10 MYALGIA: Primary | ICD-10-CM

## 2019-03-02 PROCEDURE — 99213 OFFICE O/P EST LOW 20 MIN: CPT | Performed by: FAMILY MEDICINE

## 2019-03-02 NOTE — PROGRESS NOTES
HPI:    Patient ID: Elin Archer is a 46year old male.     HPI  In the last week c/o soreness in the muscles , legs etc   Was googling and thinks may be lipitor    On it for 6 weeks now     Has soreness w running as well    Did not have those issues bronwyn

## 2019-03-29 ENCOUNTER — OFFICE VISIT (OUTPATIENT)
Dept: INTERNAL MEDICINE CLINIC | Facility: CLINIC | Age: 39
End: 2019-03-29

## 2019-03-29 ENCOUNTER — HOSPITAL ENCOUNTER (OUTPATIENT)
Dept: GENERAL RADIOLOGY | Age: 39
Discharge: HOME | End: 2019-03-29
Attending: FAMILY MEDICINE
Payer: COMMERCIAL

## 2019-03-29 VITALS
HEIGHT: 65 IN | RESPIRATION RATE: 16 BRPM | TEMPERATURE: 99 F | BODY MASS INDEX: 29.16 KG/M2 | SYSTOLIC BLOOD PRESSURE: 116 MMHG | DIASTOLIC BLOOD PRESSURE: 82 MMHG | WEIGHT: 175 LBS | HEART RATE: 72 BPM | OXYGEN SATURATION: 98 %

## 2019-03-29 DIAGNOSIS — M25.522 LEFT ELBOW PAIN: ICD-10-CM

## 2019-03-29 DIAGNOSIS — F41.9 ANXIETY: ICD-10-CM

## 2019-03-29 DIAGNOSIS — H16.219 EXPOSURE KERATOCONJUNCTIVITIS, UNSPECIFIED LATERALITY: ICD-10-CM

## 2019-03-29 DIAGNOSIS — R06.83 SNORING: ICD-10-CM

## 2019-03-29 DIAGNOSIS — M25.522 LEFT ELBOW PAIN: Primary | ICD-10-CM

## 2019-03-29 DIAGNOSIS — L65.9 HAIR THINNING: ICD-10-CM

## 2019-03-29 PROCEDURE — 73080 X-RAY EXAM OF ELBOW: CPT | Performed by: FAMILY MEDICINE

## 2019-03-29 PROCEDURE — 99214 OFFICE O/P EST MOD 30 MIN: CPT | Performed by: FAMILY MEDICINE

## 2019-03-29 RX ORDER — MELOXICAM 15 MG/1
15 TABLET ORAL DAILY
Qty: 30 TABLET | Refills: 1 | Status: SHIPPED | OUTPATIENT
Start: 2019-03-29 | End: 2019-06-05 | Stop reason: ALTCHOICE

## 2019-06-05 ENCOUNTER — LAB ENCOUNTER (OUTPATIENT)
Dept: LAB | Age: 39
End: 2019-06-05
Attending: FAMILY MEDICINE
Payer: COMMERCIAL

## 2019-06-05 ENCOUNTER — OFFICE VISIT (OUTPATIENT)
Dept: INTERNAL MEDICINE CLINIC | Facility: CLINIC | Age: 39
End: 2019-06-05

## 2019-06-05 VITALS
SYSTOLIC BLOOD PRESSURE: 104 MMHG | BODY MASS INDEX: 27.82 KG/M2 | HEART RATE: 86 BPM | OXYGEN SATURATION: 96 % | DIASTOLIC BLOOD PRESSURE: 76 MMHG | WEIGHT: 167 LBS | RESPIRATION RATE: 16 BRPM | TEMPERATURE: 98 F | HEIGHT: 65 IN

## 2019-06-05 DIAGNOSIS — L65.9 THINNING HAIR: ICD-10-CM

## 2019-06-05 DIAGNOSIS — E78.00 PURE HYPERCHOLESTEROLEMIA: ICD-10-CM

## 2019-06-05 DIAGNOSIS — Z00.00 LABORATORY EXAM ORDERED AS PART OF ROUTINE GENERAL MEDICAL EXAMINATION: ICD-10-CM

## 2019-06-05 DIAGNOSIS — Z00.00 WELLNESS EXAMINATION: Primary | ICD-10-CM

## 2019-06-05 DIAGNOSIS — I10 ESSENTIAL HYPERTENSION: ICD-10-CM

## 2019-06-05 PROCEDURE — 80061 LIPID PANEL: CPT

## 2019-06-05 PROCEDURE — 85025 COMPLETE CBC W/AUTO DIFF WBC: CPT

## 2019-06-05 PROCEDURE — 80053 COMPREHEN METABOLIC PANEL: CPT

## 2019-06-05 PROCEDURE — 36415 COLL VENOUS BLD VENIPUNCTURE: CPT

## 2019-06-05 PROCEDURE — 81003 URINALYSIS AUTO W/O SCOPE: CPT

## 2019-06-05 PROCEDURE — 99395 PREV VISIT EST AGE 18-39: CPT | Performed by: FAMILY MEDICINE

## 2019-06-05 PROCEDURE — 99213 OFFICE O/P EST LOW 20 MIN: CPT | Performed by: FAMILY MEDICINE

## 2019-06-05 RX ORDER — FINASTERIDE 1 MG/1
1 TABLET, FILM COATED ORAL DAILY
Qty: 30 TABLET | Refills: 11 | Status: SHIPPED | OUTPATIENT
Start: 2019-06-05 | End: 2020-02-01
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
## 2019-06-12 ENCOUNTER — TELEPHONE (OUTPATIENT)
Dept: INTERNAL MEDICINE CLINIC | Facility: CLINIC | Age: 39
End: 2019-06-12

## 2019-06-12 DIAGNOSIS — H02.823 CYST OF RIGHT EYELID: Primary | ICD-10-CM

## 2019-07-10 ENCOUNTER — OFFICE VISIT (OUTPATIENT)
Dept: SLEEP CENTER | Age: 39
End: 2019-07-10
Attending: FAMILY MEDICINE
Payer: COMMERCIAL

## 2019-07-10 DIAGNOSIS — R06.83 SNORING: ICD-10-CM

## 2019-07-10 PROCEDURE — 95810 POLYSOM 6/> YRS 4/> PARAM: CPT

## 2019-07-18 DIAGNOSIS — I10 ESSENTIAL HYPERTENSION: ICD-10-CM

## 2019-07-18 RX ORDER — LISINOPRIL 20 MG/1
20 TABLET ORAL DAILY
Qty: 90 TABLET | Refills: 1 | Status: SHIPPED | OUTPATIENT
Start: 2019-07-18 | End: 2020-02-01

## 2019-07-18 NOTE — TELEPHONE ENCOUNTER
Lisinopril 20 MG  Last OV relevant to medication: 6-5-19  Last refill date: 1-17-19  #/refills: 1  When pt was asked to return for OV: CPX 1 yr  Upcoming appt/reason: none  Recent labs: 6-5-19: CMP

## 2019-07-19 NOTE — PROCEDURES
[address] [address],[facility]       Accredited by the [facility] Sleep Medicine ([facility])    PATIENT'S NAME:        Manny Menjivar  ATTENDING PHYSICIAN:   Sherri Carrion M.D.   REFERRING PHYSICIAN:   Terrance Li NP  PATIENT A 13.5.  Lateral AHI was 3. REM AHI was 26. Oxygen saturation sampson was 76%, and the patient spent 1% of sleep time with oxygen levels below 90%. PERIODIC LIMB MOVEMENT:  PLM index was 0. EKG:  Normal sinus rhythm throughout.     EEG:  Based on limite

## 2019-07-23 DIAGNOSIS — G47.33 OSA (OBSTRUCTIVE SLEEP APNEA): Primary | ICD-10-CM

## 2019-08-01 DIAGNOSIS — F41.9 ANXIETY: ICD-10-CM

## 2019-08-01 RX ORDER — ALPRAZOLAM 0.25 MG/1
0.25 TABLET ORAL NIGHTLY PRN
Qty: 30 TABLET | Refills: 0 | Status: SHIPPED | OUTPATIENT
Start: 2019-08-01 | End: 2019-11-16

## 2019-08-01 NOTE — TELEPHONE ENCOUNTER
Patient calling in, requesting a refill for ALPRAZolam Dori Bustamante) 0.25 MG Oral Tab    Pharmacy:  [facility]/PHARMACY [address] [facility], [address] [facility] Zeke Wallace [address] [place] [address], [phone], [phone]

## 2019-08-09 ENCOUNTER — HOSPITAL ENCOUNTER (OUTPATIENT)
Age: 39
Discharge: HOME | End: 2019-08-09
Payer: COMMERCIAL

## 2019-08-09 VITALS
DIASTOLIC BLOOD PRESSURE: 66 MMHG | SYSTOLIC BLOOD PRESSURE: 124 MMHG | RESPIRATION RATE: 16 BRPM | TEMPERATURE: 99 F | OXYGEN SATURATION: 95 % | HEART RATE: 71 BPM | BODY MASS INDEX: 28 KG/M2 | WEIGHT: 170 LBS

## 2019-08-09 DIAGNOSIS — S29.012A STRAIN OF THORACIC SPINE: Primary | ICD-10-CM
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
## 2019-08-09 PROCEDURE — 99213 OFFICE O/P EST LOW 20 MIN: CPT
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
## 2019-08-15 ENCOUNTER — TELEPHONE (OUTPATIENT)
Dept: INTERNAL MEDICINE CLINIC | Facility: CLINIC | Age: 39
End: 2019-08-15

## 2019-08-15 DIAGNOSIS — G47.33 OSA (OBSTRUCTIVE SLEEP APNEA): Primary | ICD-10-CM

## 2019-08-15 NOTE — TELEPHONE ENCOUNTER
Spoke with pt and was told by dr Shama Vuong to see this dentist to get sleep apnea oral device Told needs to come from pcp    Advised pt we not refer to dentist using his medical insurance.     Pt dubon this insurance and told they would cover this device but n

## 2019-08-15 NOTE — TELEPHONE ENCOUNTER
DENTIST REFERRAL   Received:  Today   Message Contents   Aleksandr August [facility] Clinical Staff   Cc: [facility] Referral Pool   Phone Number: [phone]             .Reason for the order/referral: SLEEP APNEA ORAL DEVICE   PCP: Kassandra Benjamin   Refer to [facility]

## 2019-08-22 NOTE — TELEPHONE ENCOUNTER
SPECIALIST OPTIONS   Received: Today   Message Contents   Donovan Montoya [facility] Clinical Staff   Cc: Oracio Gomez RN             Hi!  Good afternoon!  I spoke with Elias Johnston @ [facility] and the specialist they recommend for this patient is     Dr. Cesar Morrow All

## 2019-11-16 DIAGNOSIS — F41.9 ANXIETY: ICD-10-CM

## 2019-11-18 RX ORDER — ALPRAZOLAM 0.25 MG/1
TABLET ORAL
Qty: 30 TABLET | Refills: 0 | Status: SHIPPED | OUTPATIENT
Start: 2019-11-18 | End: 2020-02-01

## 2020-01-14 DIAGNOSIS — I10 ESSENTIAL HYPERTENSION: ICD-10-CM

## 2020-01-14 RX ORDER — LISINOPRIL 20 MG/1
TABLET ORAL
Qty: 90 TABLET | Refills: 1 | OUTPATIENT
Start: 2020-01-14

## 2020-01-14 NOTE — TELEPHONE ENCOUNTER
Pt wants to see Dr. Rachele Morales or Meli Valencia. Scheduled an appt. On Thursday with Wali Vela in the mean time but will call to see if anyone canceled with Dr. Rachele Morales. Has enough meds. In the mean time.     Lisinopril 20 MG failed protocol due to  Hypertension Medications P

## 2020-02-01 ENCOUNTER — OFFICE VISIT (OUTPATIENT)
Dept: INTERNAL MEDICINE CLINIC | Facility: CLINIC | Age: 40
End: 2020-02-01

## 2020-02-01 VITALS
SYSTOLIC BLOOD PRESSURE: 114 MMHG | DIASTOLIC BLOOD PRESSURE: 70 MMHG | OXYGEN SATURATION: 98 % | RESPIRATION RATE: 16 BRPM | HEIGHT: 65 IN | TEMPERATURE: 98 F | BODY MASS INDEX: 29.87 KG/M2 | WEIGHT: 179.25 LBS | HEART RATE: 79 BPM

## 2020-02-01 DIAGNOSIS — E78.00 PURE HYPERCHOLESTEROLEMIA: Primary | ICD-10-CM

## 2020-02-01 DIAGNOSIS — I10 ESSENTIAL HYPERTENSION: ICD-10-CM

## 2020-02-01 DIAGNOSIS — F41.9 ANXIETY: ICD-10-CM

## 2020-02-01 DIAGNOSIS — G47.33 OSA (OBSTRUCTIVE SLEEP APNEA): ICD-10-CM

## 2020-02-01 PROCEDURE — 99214 OFFICE O/P EST MOD 30 MIN: CPT | Performed by: FAMILY MEDICINE

## 2020-02-01 RX ORDER — ALPRAZOLAM 0.25 MG/1
TABLET ORAL
Qty: 30 TABLET | Refills: 0 | Status: SHIPPED | OUTPATIENT
Start: 2020-02-01 | End: 2020-10-07

## 2020-02-01 RX ORDER — LISINOPRIL 20 MG/1
20 TABLET ORAL DAILY
Qty: 90 TABLET | Refills: 1 | Status: SHIPPED | OUTPATIENT
Start: 2020-02-01 | End: 2020-08-09

## 2020-02-01 NOTE — PROGRESS NOTES
HPI:    Patient ID: Tisha Flaherty is a 45year old male. HPI  Tisha Flaherty is a 45year old male. HPI:   Patient presents for recheck of his hypertension/med ck  .  Pt has been taking medications as instructed, no medication side effects,   Stre topically 2 (two) times daily as needed.  60 g 0      Past Medical History:   Diagnosis Date   • Chest pain Oct 2016    nl cath   • Essential hypertension    • Hyperlipidemia       Past Surgical History:   Procedure Laterality Date   • CARDIAC CATHERTERIZAT July.    Review of Systems         Current Outpatient Medications   Medication Sig Dispense Refill   • ALPRAZOLAM 0.25 MG Oral Tab TAKE 1 TABLET BY MOUTH EVERY EVENING AS NEEDED FOR SLEEP 30 tablet 0   • lisinopril 20 MG Oral Tab Take 1 tablet (20 mg total

## 2020-03-31 ENCOUNTER — TELEPHONE (OUTPATIENT)
Dept: INTERNAL MEDICINE CLINIC | Facility: CLINIC | Age: 40
End: 2020-03-31

## 2020-03-31 DIAGNOSIS — Z11.1 SCREENING FOR TUBERCULOSIS: Primary | ICD-10-CM

## 2020-03-31 NOTE — TELEPHONE ENCOUNTER
Patient called stating that his employer is requesting him to get tb test. Not scheduling appointments in clinic at the time. Please advise.

## 2020-03-31 NOTE — TELEPHONE ENCOUNTER
Spoke with Dolores Costa our supervisor about this. We are not doing TB testing in our office at this time. Pt can go to the Health department to get it done or Pt can have the blood test- Quantiferon TB test done here at the lab.

## 2020-04-13 ENCOUNTER — TELEPHONE (OUTPATIENT)
Dept: INTERNAL MEDICINE CLINIC | Facility: CLINIC | Age: 40
End: 2020-04-13

## 2020-04-13 NOTE — TELEPHONE ENCOUNTER
Pt works in a office daily and maintains social distancing ,delivers pizza on weekends . was feeling good last week ran 3 miles ,Starting feeling bad on Friday with body aches ,fatiguw . lowgrade temp 99 x 2 days ,no travel or sob ,slight cough but has had f

## 2020-04-13 NOTE — TELEPHONE ENCOUNTER
Pt called stating that he thinks he has COVID-19. Has been feeling body aches since Friday, low grade fever of 99 for x2 days, diarrhea, slight high blood pressure, slight cough.      - Insurance information confirmed/updated  - Patient informed that they m

## 2020-04-22 ENCOUNTER — VIRTUAL PHONE E/M (OUTPATIENT)
Dept: INTERNAL MEDICINE CLINIC | Facility: CLINIC | Age: 40
End: 2020-04-22

## 2020-04-22 DIAGNOSIS — R05.9 COUGH: Primary | ICD-10-CM

## 2020-04-22 PROCEDURE — 99212 OFFICE O/P EST SF 10 MIN: CPT | Performed by: FAMILY MEDICINE

## 2020-04-22 NOTE — TELEPHONE ENCOUNTER
Future Appointments   Date Time Provider Jayshree Larkin   4/22/2020 11:30 AM Mahnaz Antoine MD [facility] [place]

## 2020-04-22 NOTE — PROGRESS NOTES
Virtual Telephone Check-In    Velna Bernheim verbally consents to a Virtual/Telephone Check-In visit on 04/22/20. Patient understands and accepts financial responsibility for any deductible, co-insurance and/or co-pays associated with this service. headaches    EXAM:   There were no vitals taken for this visit.   GENERAL:   Appears nl self over the phone   Not SOB      ASSESSMENT AND PLAN:   (R05) Cough  (primary encounter diagnosis)  Plan: not COVID at this time in my opinion though should exercise c

## 2020-04-22 NOTE — TELEPHONE ENCOUNTER
Bad day Friday wet loose cough but not productive ,No fever for past 2 weeks ,Would like to schedule tele vit with provider today

## 2020-07-01 ENCOUNTER — TELEPHONE (OUTPATIENT)
Dept: INTERNAL MEDICINE CLINIC | Facility: CLINIC | Age: 40
End: 2020-07-01

## 2020-07-10 ENCOUNTER — TELEMEDICINE (OUTPATIENT)
Dept: INTERNAL MEDICINE CLINIC | Facility: CLINIC | Age: 40
End: 2020-07-10

## 2020-07-10 ENCOUNTER — TELEPHONE (OUTPATIENT)
Dept: INTERNAL MEDICINE CLINIC | Facility: CLINIC | Age: 40
End: 2020-07-10

## 2020-07-10 ENCOUNTER — LAB ENCOUNTER (OUTPATIENT)
Dept: LAB | Facility: HOSPITAL | Age: 40
End: 2020-07-10
Attending: NURSE PRACTITIONER
Payer: COMMERCIAL

## 2020-07-10 DIAGNOSIS — R52 GENERALIZED BODY ACHES: Primary | ICD-10-CM

## 2020-07-10 DIAGNOSIS — R53.83 FATIGUE, UNSPECIFIED TYPE: ICD-10-CM

## 2020-07-10 DIAGNOSIS — R63.4 WEIGHT LOSS: ICD-10-CM

## 2020-07-10 DIAGNOSIS — R52 GENERALIZED BODY ACHES: ICD-10-CM

## 2020-07-10 LAB — SARS-COV-2 RNA RESP QL NAA+PROBE: NOT DETECTED

## 2020-07-10 PROCEDURE — 99213 OFFICE O/P EST LOW 20 MIN: CPT | Performed by: NURSE PRACTITIONER

## 2020-07-10 NOTE — TELEPHONE ENCOUNTER
Pt has c/o lack of energy, \"feeling drain energy\" & body aches \"feels like he worked out. \"    Onset of symptoms? Monday w mild symptoms. Any nasal or chest congestion? No   Any pressure or severe headaches? No   Any mucous (color)?  No   Muscle or olivia

## 2020-07-10 NOTE — PROGRESS NOTES
Virtual Telephone Check-In    Ann-Marietyann Officer verbally consents to a Virtual/Telephone Check-In visit on 07/10/20. Patient has been referred to the [facility] website at [email or web address] to review the yearly Consent to Treat document.     Patient underst Tobacco comment: Pt states it is on and off he smokes    Alcohol use:  Yes      Alcohol/week: 0.0 - 2.0 standard drinks      Comment: occ    Drug use: Yes      Frequency: 2.0 times per week      Types: Cannabis       REVIEW OF SYSTEMS:   GENERAL: Jada kelsey

## 2020-07-11 ENCOUNTER — LAB ENCOUNTER (OUTPATIENT)
Dept: LAB | Facility: HOSPITAL | Age: 40
End: 2020-07-11
Attending: NURSE PRACTITIONER
Payer: COMMERCIAL

## 2020-07-11 DIAGNOSIS — R63.4 WEIGHT LOSS: ICD-10-CM

## 2020-07-11 DIAGNOSIS — R53.83 FATIGUE, UNSPECIFIED TYPE: ICD-10-CM

## 2020-07-11 DIAGNOSIS — R52 GENERALIZED BODY ACHES: ICD-10-CM

## 2020-07-11 DIAGNOSIS — Z11.1 SCREENING FOR TUBERCULOSIS: ICD-10-CM

## 2020-07-11 LAB
ALBUMIN SERPL-MCNC: 4.1 G/DL (ref 3.4–5)
ALBUMIN/GLOB SERPL: 1.3 {RATIO} (ref 1–2)
ALP LIVER SERPL-CCNC: 54 U/L (ref 45–117)
ALT SERPL-CCNC: 50 U/L (ref 16–61)
ANION GAP SERPL CALC-SCNC: 4 MMOL/L (ref 0–18)
AST SERPL-CCNC: 24 U/L (ref 15–37)
BASOPHILS # BLD AUTO: 0.04 X10(3) UL (ref 0–0.2)
BASOPHILS NFR BLD AUTO: 0.9 %
BILIRUB SERPL-MCNC: 0.5 MG/DL (ref 0.1–2)
BUN BLD-MCNC: 9 MG/DL (ref 7–18)
BUN/CREAT SERPL: 9.4 (ref 10–20)
CALCIUM BLD-MCNC: 8.9 MG/DL (ref 8.5–10.1)
CHLORIDE SERPL-SCNC: 106 MMOL/L (ref 98–112)
CO2 SERPL-SCNC: 28 MMOL/L (ref 21–32)
CREAT BLD-MCNC: 0.96 MG/DL (ref 0.7–1.3)
DEPRECATED RDW RBC AUTO: 36.5 FL (ref 35.1–46.3)
EOSINOPHIL # BLD AUTO: 0.13 X10(3) UL (ref 0–0.7)
EOSINOPHIL NFR BLD AUTO: 2.9 %
ERYTHROCYTE [DISTWIDTH] IN BLOOD BY AUTOMATED COUNT: 11.7 % (ref 11–15)
GLOBULIN PLAS-MCNC: 3.2 G/DL (ref 2.8–4.4)
GLUCOSE BLD-MCNC: 86 MG/DL (ref 70–99)
HCT VFR BLD AUTO: 48.8 % (ref 39–53)
HGB BLD-MCNC: 16.4 G/DL (ref 13–17.5)
IMM GRANULOCYTES # BLD AUTO: 0.03 X10(3) UL (ref 0–1)
IMM GRANULOCYTES NFR BLD: 0.7 %
LYMPHOCYTES # BLD AUTO: 1.08 X10(3) UL (ref 1–4)
LYMPHOCYTES NFR BLD AUTO: 24.1 %
M PROTEIN MFR SERPL ELPH: 7.3 G/DL (ref 6.4–8.2)
MCH RBC QN AUTO: 28.8 PG (ref 26–34)
MCHC RBC AUTO-ENTMCNC: 33.6 G/DL (ref 31–37)
MCV RBC AUTO: 85.8 FL (ref 80–100)
MONOCYTES # BLD AUTO: 0.32 X10(3) UL (ref 0.1–1)
MONOCYTES NFR BLD AUTO: 7.1 %
MONOSCREEN: NEGATIVE
NEUTROPHILS # BLD AUTO: 2.89 X10 (3) UL (ref 1.5–7.7)
NEUTROPHILS # BLD AUTO: 2.89 X10(3) UL (ref 1.5–7.7)
NEUTROPHILS NFR BLD AUTO: 64.3 %
OSMOLALITY SERPL CALC.SUM OF ELEC: 284 MOSM/KG (ref 275–295)
PATIENT FASTING Y/N/NP: YES
PLATELET # BLD AUTO: 221 10(3)UL (ref 150–450)
POTASSIUM SERPL-SCNC: 4 MMOL/L (ref 3.5–5.1)
RBC # BLD AUTO: 5.69 X10(6)UL (ref 4.3–5.7)
SODIUM SERPL-SCNC: 138 MMOL/L (ref 136–145)
T4 FREE SERPL-MCNC: 0.9 NG/DL (ref 0.8–1.7)
TSI SER-ACNC: 1.16 MIU/ML (ref 0.36–3.74)
VIT B12 SERPL-MCNC: 522 PG/ML (ref 193–986)
WBC # BLD AUTO: 4.5 X10(3) UL (ref 4–11)

## 2020-07-11 PROCEDURE — 84439 ASSAY OF FREE THYROXINE: CPT

## 2020-07-11 PROCEDURE — 85025 COMPLETE CBC W/AUTO DIFF WBC: CPT

## 2020-07-11 PROCEDURE — 82607 VITAMIN B-12: CPT

## 2020-07-11 PROCEDURE — 86480 TB TEST CELL IMMUN MEASURE: CPT

## 2020-07-11 PROCEDURE — 80053 COMPREHEN METABOLIC PANEL: CPT

## 2020-07-11 PROCEDURE — 82652 VIT D 1 25-DIHYDROXY: CPT

## 2020-07-11 PROCEDURE — 36415 COLL VENOUS BLD VENIPUNCTURE: CPT

## 2020-07-11 PROCEDURE — 86403 PARTICLE AGGLUT ANTBDY SCRN: CPT

## 2020-07-11 PROCEDURE — 84443 ASSAY THYROID STIM HORMONE: CPT

## 2020-07-11 PROCEDURE — 86038 ANTINUCLEAR ANTIBODIES: CPT

## 2020-07-13 LAB — ANA SCREEN: NEGATIVE

## 2020-07-14 LAB — 1,25-DIHYDROXYVITAMIN D: 53.1 PG/ML

## 2020-07-15 LAB
M TB IFN-G CD4+ T-CELLS BLD-ACNC: 0.03 IU/ML
M TB TUBERC IFN-G BLD QL: NEGATIVE
M TB TUBERC IGNF/MITOGEN IGNF CONTROL: 9.34 IU/ML
QUANTIFERON TB1 MINUS NIL: 0.01 IU/ML
QUANTIFERON TB2 MINUS NIL: 0 IU/ML

## 2020-08-08 DIAGNOSIS — I10 ESSENTIAL HYPERTENSION: ICD-10-CM

## 2020-08-08 NOTE — TELEPHONE ENCOUNTER
LOV: 7/10/2020 with LAM Dixon  RTC: no follow-up on file  Last Relevant Labs: July 2020   Filled: 2/1/2020  #90 with 1 refill    No future appointments.

## 2020-08-09 RX ORDER — LISINOPRIL 20 MG/1
TABLET ORAL
Qty: 90 TABLET | Refills: 0 | Status: SHIPPED | OUTPATIENT
Start: 2020-08-09 | End: 2020-11-13

## 2020-10-05 ENCOUNTER — TELEPHONE (OUTPATIENT)
Dept: INTERNAL MEDICINE CLINIC | Facility: CLINIC | Age: 40
End: 2020-10-05

## 2020-10-05 NOTE — TELEPHONE ENCOUNTER
Patient calling to request a COVID 19 test order. Screened and patient started with sore throat a few days ago; this is his only symptom w/ no exposure.   Please call to triage as needed/request order from doctor

## 2020-10-05 NOTE — TELEPHONE ENCOUNTER
Spoke with patient stating has sore throat, congestion, body aches since yesterday, no fevers, no chills, no cough, no SOB, no HA, No abdominal pain or any other symptoms at this time. Patient is taking Zicam nasal spray over the counter.  Patient has not b

## 2020-10-06 DIAGNOSIS — F41.9 ANXIETY: ICD-10-CM

## 2020-10-06 NOTE — TELEPHONE ENCOUNTER
Alprazolam 0.25 mg  Last OV relevant to medication: 2-1-20  Last refill date: 2-1-20 #/refills: 0  When pt was asked to return for OV: July  Upcoming appt/reason: none  Recent labs: none

## 2020-10-07 RX ORDER — ALPRAZOLAM 0.25 MG/1
TABLET ORAL
Qty: 30 TABLET | Refills: 0 | Status: SHIPPED | OUTPATIENT
Start: 2020-10-07 | End: 2021-03-04

## 2020-11-05 ENCOUNTER — APPOINTMENT (OUTPATIENT)
Dept: LAB | Age: 40
End: 2020-11-05
Attending: FAMILY MEDICINE
Payer: COMMERCIAL

## 2020-11-05 ENCOUNTER — TELEMEDICINE (OUTPATIENT)
Dept: INTERNAL MEDICINE CLINIC | Facility: CLINIC | Age: 40
End: 2020-11-05

## 2020-11-05 DIAGNOSIS — B34.9 VIRAL SYNDROME: Primary | ICD-10-CM

## 2020-11-05 DIAGNOSIS — B34.9 VIRAL SYNDROME: ICD-10-CM

## 2020-11-05 PROCEDURE — 99213 OFFICE O/P EST LOW 20 MIN: CPT | Performed by: FAMILY MEDICINE

## 2020-11-05 NOTE — PROGRESS NOTES
Ezio Officer is a 45year old male.   HPI:   Patient has agreed to do a doximity video encounter w me today in lieu of a regular appointment in regards to his URI s/s    Last night started to get chills,   No ST  Had cough yesterday but thought allergies hydrated   No work until results back    Call if any changes        The patient indicates understanding of these issues and agrees to the plan. Katerine Mayberry

## 2020-11-11 ENCOUNTER — TELEMEDICINE (OUTPATIENT)
Dept: INTERNAL MEDICINE CLINIC | Facility: CLINIC | Age: 40
End: 2020-11-11

## 2020-11-11 DIAGNOSIS — U07.1 COVID-19 VIRUS INFECTION: Primary | ICD-10-CM

## 2020-11-11 PROCEDURE — 99213 OFFICE O/P EST LOW 20 MIN: CPT | Performed by: FAMILY MEDICINE

## 2020-11-11 NOTE — PROGRESS NOTES
Mya Guajardo is a 45year old male.   HPI:   Patient has agreed to do a doximity encounter w me today in lieu of a regular appointment in regards to COVID infection    Has no more fevers  Still w cough  No CP  No SOB  Taste effected a little   No nvd   I diagnosis)  Plan: d/w pt his current s/s    Seems t be doing ok and even getting better   Will wrtite letter for work from 11/5 to the 19th         The patient indicates understanding of these issues and agrees to the plan. Mohsen Angel

## 2020-11-13 DIAGNOSIS — I10 ESSENTIAL HYPERTENSION: ICD-10-CM

## 2020-11-13 RX ORDER — LISINOPRIL 20 MG/1
TABLET ORAL
Qty: 90 TABLET | Refills: 0 | Status: SHIPPED | OUTPATIENT
Start: 2020-11-13 | End: 2021-01-11

## 2020-11-13 NOTE — TELEPHONE ENCOUNTER
Failed protocol     Last refill:  8/9/2020 Lisinopril 20 mg #90 NR    LOV:  11/11/2020 Dr Breanne Hurd - telemedicine COVID       LOV: in office 2/1/2020 Dr Breanne Hurd RT July     No FOV scheduled

## 2021-01-11 ENCOUNTER — OFFICE VISIT (OUTPATIENT)
Dept: INTERNAL MEDICINE CLINIC | Facility: CLINIC | Age: 41
End: 2021-01-11

## 2021-01-11 VITALS
BODY MASS INDEX: 28.82 KG/M2 | HEART RATE: 70 BPM | HEIGHT: 65 IN | SYSTOLIC BLOOD PRESSURE: 124 MMHG | OXYGEN SATURATION: 99 % | WEIGHT: 173 LBS | DIASTOLIC BLOOD PRESSURE: 76 MMHG | RESPIRATION RATE: 16 BRPM

## 2021-01-11 DIAGNOSIS — Z00.00 WELLNESS EXAMINATION: Primary | ICD-10-CM

## 2021-01-11 DIAGNOSIS — I10 ESSENTIAL HYPERTENSION: ICD-10-CM

## 2021-01-11 DIAGNOSIS — Z00.00 LABORATORY EXAM ORDERED AS PART OF ROUTINE GENERAL MEDICAL EXAMINATION: ICD-10-CM

## 2021-01-11 PROCEDURE — 3008F BODY MASS INDEX DOCD: CPT | Performed by: FAMILY MEDICINE

## 2021-01-11 PROCEDURE — 3074F SYST BP LT 130 MM HG: CPT | Performed by: FAMILY MEDICINE

## 2021-01-11 PROCEDURE — 3078F DIAST BP <80 MM HG: CPT | Performed by: FAMILY MEDICINE

## 2021-01-11 PROCEDURE — 99396 PREV VISIT EST AGE 40-64: CPT | Performed by: FAMILY MEDICINE

## 2021-01-11 RX ORDER — LISINOPRIL 20 MG/1
20 TABLET ORAL DAILY
Qty: 90 TABLET | Refills: 1 | Status: SHIPPED | OUTPATIENT
Start: 2021-01-11 | End: 2021-06-15

## 2021-01-11 RX ORDER — CLOTRIMAZOLE AND BETAMETHASONE DIPROPIONATE 10; .64 MG/G; MG/G
1 CREAM TOPICAL 2 TIMES DAILY PRN
Qty: 60 G | Refills: 0 | Status: SHIPPED | OUTPATIENT
Start: 2021-01-11

## 2021-01-11 NOTE — PROGRESS NOTES
Tisha Flaherty is a 37year old male who presents for a complete physical exam.   HPI:       Wt Readings from Last 6 Encounters:  01/11/21 : 173 lb (78.5 kg)  02/01/20 : 179 lb 4 oz (81.3 kg)  08/15/19 : 170 lb (77.1 kg)  08/09/19 : 170 lb (77.1 kg)  06/0 2003    resection      Family History   Problem Relation Age of Onset   • Other (essential HTN) Father       Social History:  Social History    Tobacco Use      Smoking status: Current Some Day Smoker        Years: 26.00        Types: Cigarettes        Mich Strange stress can affect his sense of well being   The patient indicates understanding of these issues and agrees to the plan. The patient is asked to return for CPX in 1yr.    BP ck 6 mo

## 2021-01-18 ENCOUNTER — LAB ENCOUNTER (OUTPATIENT)
Dept: LAB | Age: 41
End: 2021-01-18
Attending: FAMILY MEDICINE
Payer: COMMERCIAL

## 2021-01-18 DIAGNOSIS — Z00.00 LABORATORY EXAM ORDERED AS PART OF ROUTINE GENERAL MEDICAL EXAMINATION: ICD-10-CM

## 2021-01-18 DIAGNOSIS — Z00.00 WELLNESS EXAMINATION: ICD-10-CM

## 2021-01-18 LAB
ALBUMIN SERPL-MCNC: 4.2 G/DL (ref 3.4–5)
ALBUMIN/GLOB SERPL: 1.4 {RATIO} (ref 1–2)
ALP LIVER SERPL-CCNC: 50 U/L
ALT SERPL-CCNC: 57 U/L
ANION GAP SERPL CALC-SCNC: 4 MMOL/L (ref 0–18)
AST SERPL-CCNC: 27 U/L (ref 15–37)
BASOPHILS # BLD AUTO: 0.06 X10(3) UL (ref 0–0.2)
BASOPHILS NFR BLD AUTO: 1.3 %
BILIRUB SERPL-MCNC: 0.5 MG/DL (ref 0.1–2)
BILIRUB UR QL STRIP.AUTO: NEGATIVE
BUN BLD-MCNC: 17 MG/DL (ref 7–18)
BUN/CREAT SERPL: 15.5 (ref 10–20)
CALCIUM BLD-MCNC: 9.7 MG/DL (ref 8.5–10.1)
CHLORIDE SERPL-SCNC: 106 MMOL/L (ref 98–112)
CHOLEST SMN-MCNC: 243 MG/DL (ref ?–200)
CLARITY UR REFRACT.AUTO: CLEAR
CO2 SERPL-SCNC: 27 MMOL/L (ref 21–32)
COLOR UR AUTO: YELLOW
CREAT BLD-MCNC: 1.1 MG/DL
DEPRECATED RDW RBC AUTO: 37.5 FL (ref 35.1–46.3)
EOSINOPHIL # BLD AUTO: 0.21 X10(3) UL (ref 0–0.7)
EOSINOPHIL NFR BLD AUTO: 4.6 %
ERYTHROCYTE [DISTWIDTH] IN BLOOD BY AUTOMATED COUNT: 11.9 % (ref 11–15)
GLOBULIN PLAS-MCNC: 2.9 G/DL (ref 2.8–4.4)
GLUCOSE BLD-MCNC: 83 MG/DL (ref 70–99)
GLUCOSE UR STRIP.AUTO-MCNC: NEGATIVE MG/DL
HCT VFR BLD AUTO: 47.8 %
HDLC SERPL-MCNC: 39 MG/DL (ref 40–59)
HGB BLD-MCNC: 16.5 G/DL
IMM GRANULOCYTES # BLD AUTO: 0.04 X10(3) UL (ref 0–1)
IMM GRANULOCYTES NFR BLD: 0.9 %
KETONES UR STRIP.AUTO-MCNC: NEGATIVE MG/DL
LDLC SERPL CALC-MCNC: 156 MG/DL (ref ?–100)
LEUKOCYTE ESTERASE UR QL STRIP.AUTO: NEGATIVE
LYMPHOCYTES # BLD AUTO: 1.32 X10(3) UL (ref 1–4)
LYMPHOCYTES NFR BLD AUTO: 28.7 %
M PROTEIN MFR SERPL ELPH: 7.1 G/DL (ref 6.4–8.2)
MCH RBC QN AUTO: 30 PG (ref 26–34)
MCHC RBC AUTO-ENTMCNC: 34.5 G/DL (ref 31–37)
MCV RBC AUTO: 86.9 FL
MONOCYTES # BLD AUTO: 0.37 X10(3) UL (ref 0.1–1)
MONOCYTES NFR BLD AUTO: 8 %
NEUTROPHILS # BLD AUTO: 2.6 X10 (3) UL (ref 1.5–7.7)
NEUTROPHILS # BLD AUTO: 2.6 X10(3) UL (ref 1.5–7.7)
NEUTROPHILS NFR BLD AUTO: 56.5 %
NITRITE UR QL STRIP.AUTO: NEGATIVE
NONHDLC SERPL-MCNC: 204 MG/DL (ref ?–130)
OSMOLALITY SERPL CALC.SUM OF ELEC: 285 MOSM/KG (ref 275–295)
PATIENT FASTING Y/N/NP: YES
PATIENT FASTING Y/N/NP: YES
PH UR STRIP.AUTO: 6 [PH] (ref 4.5–8)
PLATELET # BLD AUTO: 225 10(3)UL (ref 150–450)
POTASSIUM SERPL-SCNC: 4 MMOL/L (ref 3.5–5.1)
PROT UR STRIP.AUTO-MCNC: NEGATIVE MG/DL
RBC # BLD AUTO: 5.5 X10(6)UL
RBC UR QL AUTO: NEGATIVE
SODIUM SERPL-SCNC: 137 MMOL/L (ref 136–145)
SP GR UR STRIP.AUTO: 1.02 (ref 1–1.03)
TRIGL SERPL-MCNC: 238 MG/DL (ref 30–149)
UROBILINOGEN UR STRIP.AUTO-MCNC: <2 MG/DL
VLDLC SERPL CALC-MCNC: 48 MG/DL (ref 0–30)
WBC # BLD AUTO: 4.6 X10(3) UL (ref 4–11)

## 2021-01-18 PROCEDURE — 36415 COLL VENOUS BLD VENIPUNCTURE: CPT

## 2021-01-18 PROCEDURE — 81003 URINALYSIS AUTO W/O SCOPE: CPT

## 2021-01-18 PROCEDURE — 80053 COMPREHEN METABOLIC PANEL: CPT

## 2021-01-18 PROCEDURE — 85025 COMPLETE CBC W/AUTO DIFF WBC: CPT

## 2021-01-18 PROCEDURE — 80061 LIPID PANEL: CPT

## 2021-01-19 DIAGNOSIS — E78.00 PURE HYPERCHOLESTEROLEMIA: Primary | ICD-10-CM

## 2021-01-19 RX ORDER — ROSUVASTATIN CALCIUM 5 MG/1
5 TABLET, COATED ORAL NIGHTLY
Qty: 90 TABLET | Refills: 0 | Status: SHIPPED | OUTPATIENT
Start: 2021-01-19 | End: 2021-03-24

## 2021-03-02 DIAGNOSIS — F41.9 ANXIETY: ICD-10-CM

## 2021-03-04 RX ORDER — ALPRAZOLAM 0.25 MG/1
TABLET ORAL
Qty: 30 TABLET | Refills: 0 | Status: SHIPPED | OUTPATIENT
Start: 2021-03-04 | End: 2021-06-15

## 2021-03-04 NOTE — TELEPHONE ENCOUNTER
Medication(s) to Refill:   Requested Prescriptions     Pending Prescriptions Disp Refills   • ALPRAZOLAM 0.25 MG Oral Tab [Pharmacy Med Name: ALPRAZOLAM 0.25 MG TABLET] 30 tablet 0     Sig: TAKE 1 TABLET BY MOUTH EVERY EVENING AS NEEDED FOR SLEEP       LOV

## 2021-03-24 ENCOUNTER — OFFICE VISIT (OUTPATIENT)
Dept: INTERNAL MEDICINE CLINIC | Facility: CLINIC | Age: 41
End: 2021-03-24

## 2021-03-24 VITALS
WEIGHT: 180 LBS | HEIGHT: 65 IN | BODY MASS INDEX: 29.99 KG/M2 | SYSTOLIC BLOOD PRESSURE: 126 MMHG | OXYGEN SATURATION: 99 % | RESPIRATION RATE: 12 BRPM | DIASTOLIC BLOOD PRESSURE: 68 MMHG | HEART RATE: 70 BPM | TEMPERATURE: 98 F

## 2021-03-24 DIAGNOSIS — E78.00 PURE HYPERCHOLESTEROLEMIA: ICD-10-CM

## 2021-03-24 DIAGNOSIS — H53.9 VISION CHANGES: Primary | ICD-10-CM

## 2021-03-24 PROCEDURE — 3008F BODY MASS INDEX DOCD: CPT | Performed by: FAMILY MEDICINE

## 2021-03-24 PROCEDURE — 3078F DIAST BP <80 MM HG: CPT | Performed by: FAMILY MEDICINE

## 2021-03-24 PROCEDURE — 99213 OFFICE O/P EST LOW 20 MIN: CPT | Performed by: FAMILY MEDICINE

## 2021-03-24 PROCEDURE — 3074F SYST BP LT 130 MM HG: CPT | Performed by: FAMILY MEDICINE

## 2021-03-24 RX ORDER — ROSUVASTATIN CALCIUM 5 MG/1
5 TABLET, COATED ORAL NIGHTLY
Qty: 90 TABLET | Refills: 0 | Status: SHIPPED | OUTPATIENT
Start: 2021-03-24 | End: 2021-06-15

## 2021-03-24 NOTE — PROGRESS NOTES
CHIEF COMPLAINT:     Patient presents with:  Eye Problem: Troubles focusing, wandering eyes      HPI:   Ezio Officer is a 37year old male .   Pt c/o of visual issue on and off thru the years but on Sunday Pt started to have issues with focusing, seeing off he smokes    Vaping Use      Vaping Use: Never used    Alcohol use:  Yes      Alcohol/week: 0.0 - 2.0 standard drinks      Comment: occ    Drug use: Yes      Types: Cannabis      Comment: occ       REVIEW OF SYSTEMS:   GENERAL: Denies fever, chills,weig Refill: 0    2. Vision changes  - OPHTHALMOLOGY - INTERNAL  Referral given to Optho. If visual changes worsen Pt to go to the ER to be assessed. The patient indicates understanding of these issues and agrees to the plan.   The patient is asked to return as

## 2021-06-15 DIAGNOSIS — E78.00 PURE HYPERCHOLESTEROLEMIA: ICD-10-CM

## 2021-06-15 RX ORDER — ROSUVASTATIN CALCIUM 5 MG/1
TABLET, COATED ORAL
Qty: 90 TABLET | Refills: 0 | Status: SHIPPED | OUTPATIENT
Start: 2021-06-15 | End: 2021-10-14

## 2021-06-15 NOTE — PROGRESS NOTES
CHIEF COMPLAINT:     Patient presents with:  Medication Follow-Up      HPI:   Vanessa Clark is a 37year old male Patient presents for recheck of his hypertension.  Pt has been taking medications as instructed, no medication side effects, home BP monitori Tab Take 10 mg by mouth daily.         Past Medical History:   Diagnosis Date   • Chest pain Oct 2016    nl cath   • Essential hypertension    • Hyperlipidemia       Social History:  Social History    Tobacco Use      Smoking status: Former [address] hypertension  Controlled CPM  - lisinopril 20 MG Oral Tab; Take 1 tablet (20 mg total) by mouth daily. Dispense: 90 tablet; Refill: 1    3. Pure hypercholesterolemia  Wait 3 months for lipid, AST and ALT as pt just started medication.  May try adding CoQ10

## 2021-06-15 NOTE — TELEPHONE ENCOUNTER
Name from pharmacy: ROSUVASTATIN CALCIUM 5 MG TAB          Will file in chart as: ROSUVASTATIN CALCIUM 5 MG Oral Tab    Sig: TAKE 1 TABLET BY MOUTH EVERY DAY AT NIGHT    Disp:  90 tablet    Refills:  0 (Pharmacy requested: Not specified)    Start: 6/15/202

## 2021-06-16 NOTE — PATIENT INSTRUCTIONS
1. Can add CoQ10 for muscle aches if they continue while taking Rosuvastatin   2. Wait 3 months to repeat liver enzymes and cholesterol since you just started medication  3.  If starting finasteride for hair thinning please complete PSA level prior to start

## 2021-08-14 ENCOUNTER — OFFICE VISIT (OUTPATIENT)
Dept: FAMILY MEDICINE CLINIC | Facility: CLINIC | Age: 41
End: 2021-08-14

## 2021-08-14 VITALS
TEMPERATURE: 98 F | HEART RATE: 74 BPM | RESPIRATION RATE: 18 BRPM | WEIGHT: 177 LBS | OXYGEN SATURATION: 98 % | BODY MASS INDEX: 29.49 KG/M2 | DIASTOLIC BLOOD PRESSURE: 70 MMHG | SYSTOLIC BLOOD PRESSURE: 152 MMHG | HEIGHT: 65 IN

## 2021-08-14 DIAGNOSIS — R09.89 RUNNY NOSE: ICD-10-CM

## 2021-08-14 DIAGNOSIS — R09.82 POST-NASAL DRAINAGE: Primary | ICD-10-CM

## 2021-08-14 PROCEDURE — 3078F DIAST BP <80 MM HG: CPT | Performed by: NURSE PRACTITIONER

## 2021-08-14 PROCEDURE — 99213 OFFICE O/P EST LOW 20 MIN: CPT | Performed by: NURSE PRACTITIONER

## 2021-08-14 PROCEDURE — 3077F SYST BP >= 140 MM HG: CPT | Performed by: NURSE PRACTITIONER

## 2021-08-14 PROCEDURE — 3008F BODY MASS INDEX DOCD: CPT | Performed by: NURSE PRACTITIONER

## 2021-08-14 NOTE — PROGRESS NOTES
CHIEF COMPLAINT:     Patient presents with: Allergies: Running nose - Entered by patient      HPI:   Sharath Horton is a 37year old male who presents with allergy symptoms for  2 days. . Symptoms have progressed into  runny nose  Cough.  Has not treated s REVIEW OF SYSTEMS:   GENERAL:  decreased appetite  SKIN: no rashes or abnormal skin lesions  HEENT: See HPI.     LUNGS: denies shortness of breath or wheezing   CARDIOVASCULAR: denies chest pain or palpitations   GI: denies abdominal pain or nausea  JOHN departure. Follow up prn or with pcp if symptoms worsen or persist within 2-3 days as discussed. Patient understands and agrees with plan.      Mich Nguyen, APRN  8/14/2021  11:12 AM

## 2021-08-19 ENCOUNTER — TELEMEDICINE (OUTPATIENT)
Dept: INTERNAL MEDICINE CLINIC | Facility: CLINIC | Age: 41
End: 2021-08-19

## 2021-08-19 DIAGNOSIS — Z91.09 ENVIRONMENTAL ALLERGIES: Primary | ICD-10-CM

## 2021-08-19 DIAGNOSIS — J30.2 SEASONAL ALLERGIES: ICD-10-CM

## 2021-08-19 PROCEDURE — 99212 OFFICE O/P EST SF 10 MIN: CPT | Performed by: FAMILY MEDICINE

## 2021-08-19 NOTE — PROGRESS NOTES
Virtual Video Check-In     This visit is conducted using Telemedicine with live, interactive video and audio.     Michael Diaz, who has verified his/her identification by name and , verbally consents to a Virtual/Telephone Check-In visit on 21 FOR MEN) 2 % External Solution Apply topically 2 (two) times daily. 0   • loratadine 10 MG Oral Tab Take 10 mg by mouth daily.          Past Medical History:   Diagnosis Date   • Chest pain Oct 2016    nl cath   • Essential hypertension    • Hyperlipidemia the plan. The patient is asked to call if symptoms are not improving. *Please note that the following visit was completed using two-way, real-time interactive audio and/or video communication.   This has been done in good mervat to provide continuity of

## 2021-09-12 DIAGNOSIS — L65.9 HAIR THINNING: ICD-10-CM

## 2021-09-14 RX ORDER — FINASTERIDE 1 MG/1
TABLET, FILM COATED ORAL
Qty: 90 TABLET | Refills: 1 | Status: SHIPPED | OUTPATIENT
Start: 2021-09-14

## 2021-09-14 NOTE — TELEPHONE ENCOUNTER
LOV: 8/19/2021 with Doc STACY Florence  RTC: no follow-up on file  Last Relevant Labs: 1/18/2021   Filled: 6/15/2021   #30 with 2 refills    No future appointments.

## 2021-10-07 ENCOUNTER — OFFICE VISIT (OUTPATIENT)
Dept: INTERNAL MEDICINE CLINIC | Facility: CLINIC | Age: 41
End: 2021-10-07

## 2021-10-07 ENCOUNTER — LAB ENCOUNTER (OUTPATIENT)
Dept: LAB | Age: 41
End: 2021-10-07
Attending: NURSE PRACTITIONER
Payer: COMMERCIAL

## 2021-10-07 VITALS
HEIGHT: 65 IN | RESPIRATION RATE: 16 BRPM | DIASTOLIC BLOOD PRESSURE: 70 MMHG | HEART RATE: 74 BPM | WEIGHT: 177 LBS | BODY MASS INDEX: 29.49 KG/M2 | TEMPERATURE: 98 F | SYSTOLIC BLOOD PRESSURE: 116 MMHG

## 2021-10-07 DIAGNOSIS — L65.9 HAIR THINNING: ICD-10-CM

## 2021-10-07 DIAGNOSIS — E78.00 PURE HYPERCHOLESTEROLEMIA: ICD-10-CM

## 2021-10-07 DIAGNOSIS — K21.9 GASTROESOPHAGEAL REFLUX DISEASE, UNSPECIFIED WHETHER ESOPHAGITIS PRESENT: Primary | ICD-10-CM

## 2021-10-07 DIAGNOSIS — R19.5 CHANGE IN CONSISTENCY OF STOOL: ICD-10-CM

## 2021-10-07 PROCEDURE — 84450 TRANSFERASE (AST) (SGOT): CPT

## 2021-10-07 PROCEDURE — 3078F DIAST BP <80 MM HG: CPT | Performed by: FAMILY MEDICINE

## 2021-10-07 PROCEDURE — 80061 LIPID PANEL: CPT

## 2021-10-07 PROCEDURE — 84443 ASSAY THYROID STIM HORMONE: CPT

## 2021-10-07 PROCEDURE — 3074F SYST BP LT 130 MM HG: CPT | Performed by: FAMILY MEDICINE

## 2021-10-07 PROCEDURE — 3008F BODY MASS INDEX DOCD: CPT | Performed by: FAMILY MEDICINE

## 2021-10-07 PROCEDURE — 84460 ALANINE AMINO (ALT) (SGPT): CPT

## 2021-10-07 PROCEDURE — 99213 OFFICE O/P EST LOW 20 MIN: CPT | Performed by: FAMILY MEDICINE

## 2021-10-07 PROCEDURE — 36415 COLL VENOUS BLD VENIPUNCTURE: CPT

## 2021-10-07 RX ORDER — PANTOPRAZOLE SODIUM 40 MG/1
40 TABLET, DELAYED RELEASE ORAL
Qty: 30 TABLET | Refills: 1 | Status: SHIPPED | OUTPATIENT
Start: 2021-10-07 | End: 2021-11-06

## 2021-10-07 NOTE — PROGRESS NOTES
CHIEF COMPLAINT:     Patient presents with:  Gastro-esophageal Reflux: started around Friday, but happened more so on Monday. Pt states eating habits are not that great. Feels like he gets a burning sensation in stomach.        HPI:   Rafeal Arreola is a 4 it is on and off he smokes    Vaping Use      Vaping Use: Never used    Alcohol use:  Yes      Alcohol/week: 0.0 - 2.0 standard drinks      Comment: occ    Drug use: Yes      Types: Cannabis      Comment: occ - edibles       REVIEW OF SYSTEMS:   GENERAL: Randee Wallace improve symptoms. Also patient should avoid eating or drinking two to three hours before bed. The following should be avoided: caffeine containing drinks/foods, tobacco, mints, fried foods or anyother foods noted by the patient to cause GERD sx's.  Michael baxter

## 2021-10-07 NOTE — PATIENT INSTRUCTIONS
GERD (Adult)    The esophagus is a tube that carries food from the mouth to the stomach. A valve (the LES, lower esophageal sphincter) at the lower end of the esophagus prevents stomach acid from flowing upward.  When this valve doesn't work properly, sto body (not just your head.) Or, place 4\" blocks under the head of your bed. Or use 2 bed risers under your bedframe. Medicines  If needed, medicines can help relieve the symptoms of GERD and prevent damage to the esophagus.  Discuss a medicine plan with yo GERD, stomach acid feels as if it’s backing up toward your mouth. Making lifestyle changes can often improve your symptoms. This is true if you take medicine to control your GERD or not. Talk with your healthcare provider about the following suggestions.  Nell Todd

## 2021-10-09 PROCEDURE — 87338 HPYLORI STOOL AG IA: CPT

## 2021-10-12 ENCOUNTER — LAB ENCOUNTER (OUTPATIENT)
Dept: LAB | Age: 41
End: 2021-10-12
Attending: FAMILY MEDICINE
Payer: COMMERCIAL

## 2021-10-12 DIAGNOSIS — R19.5 CHANGE IN CONSISTENCY OF STOOL: ICD-10-CM

## 2021-10-14 ENCOUNTER — TELEPHONE (OUTPATIENT)
Dept: INTERNAL MEDICINE CLINIC | Facility: CLINIC | Age: 41
End: 2021-10-14

## 2021-10-14 DIAGNOSIS — E78.00 PURE HYPERCHOLESTEROLEMIA: Primary | ICD-10-CM

## 2021-10-14 RX ORDER — PITAVASTATIN CALCIUM 2.09 MG/1
2 TABLET, FILM COATED ORAL NIGHTLY
Qty: 90 TABLET | Refills: 0 | Status: SHIPPED | OUTPATIENT
Start: 2021-10-14 | End: 2021-11-09

## 2021-11-06 RX ORDER — PANTOPRAZOLE SODIUM 40 MG/1
TABLET, DELAYED RELEASE ORAL
Qty: 30 TABLET | Refills: 1 | Status: SHIPPED | OUTPATIENT
Start: 2021-11-06 | End: 2021-11-09

## 2021-11-09 ENCOUNTER — OFFICE VISIT (OUTPATIENT)
Dept: INTERNAL MEDICINE CLINIC | Facility: CLINIC | Age: 41
End: 2021-11-09

## 2021-11-09 VITALS
BODY MASS INDEX: 29.49 KG/M2 | RESPIRATION RATE: 16 BRPM | HEIGHT: 65 IN | SYSTOLIC BLOOD PRESSURE: 120 MMHG | HEART RATE: 78 BPM | TEMPERATURE: 98 F | WEIGHT: 177 LBS | DIASTOLIC BLOOD PRESSURE: 76 MMHG

## 2021-11-09 DIAGNOSIS — K59.00 CONSTIPATION, UNSPECIFIED CONSTIPATION TYPE: Primary | ICD-10-CM

## 2021-11-09 DIAGNOSIS — K21.9 GASTROESOPHAGEAL REFLUX DISEASE WITHOUT ESOPHAGITIS: ICD-10-CM

## 2021-11-09 DIAGNOSIS — E78.00 PURE HYPERCHOLESTEROLEMIA: ICD-10-CM

## 2021-11-09 PROCEDURE — 3078F DIAST BP <80 MM HG: CPT | Performed by: FAMILY MEDICINE

## 2021-11-09 PROCEDURE — 3074F SYST BP LT 130 MM HG: CPT | Performed by: FAMILY MEDICINE

## 2021-11-09 PROCEDURE — 3008F BODY MASS INDEX DOCD: CPT | Performed by: FAMILY MEDICINE

## 2021-11-09 PROCEDURE — 99214 OFFICE O/P EST MOD 30 MIN: CPT | Performed by: FAMILY MEDICINE

## 2021-11-09 RX ORDER — PITAVASTATIN CALCIUM 2.09 MG/1
2 TABLET, FILM COATED ORAL NIGHTLY
Qty: 90 TABLET | Refills: 0 | Status: SHIPPED | OUTPATIENT
Start: 2021-11-09 | End: 2021-12-28

## 2021-11-09 NOTE — PROGRESS NOTES
CHIEF COMPLAINT:     Patient presents with: Follow - Up: GERD f/u. Is better overall but does still get bad. Pt states today is okay. HPI:   Silvino Gordon is a 37year old male . Pt is here to recheck his GERD condition.  Pt never took the [employer] states it is on and off he smokes    Vaping Use      Vaping Use: Never used    Alcohol use:  Yes      Alcohol/week: 0.0 - 2.0 standard drinks      Comment: occ    Drug use: Yes      Types: Cannabis      Comment: occ - edibles       REVIEW OF SYSTEMS:   GENE Constipation, unspecified constipation type  Pt has a hx of constipation. Pt is instructed to increase fiber in the diet by eating a bran cereal in the morning and more fruits and vegetables during the day. Pt also to use a stool softner if needed.     - GA

## 2021-11-15 ENCOUNTER — TELEPHONE (OUTPATIENT)
Dept: INTERNAL MEDICINE CLINIC | Facility: CLINIC | Age: 41
End: 2021-11-15

## 2021-11-15 DIAGNOSIS — E78.00 PURE HYPERCHOLESTEROLEMIA: Primary | ICD-10-CM

## 2021-11-15 NOTE — TELEPHONE ENCOUNTER
Incoming fax from [facility] requesting alternative for Livalo 2mg tablets    Please advise

## 2021-11-16 RX ORDER — EZETIMIBE 10 MG/1
10 TABLET ORAL NIGHTLY
Qty: 90 TABLET | Refills: 1 | Status: SHIPPED | OUTPATIENT
Start: 2021-11-16

## 2021-11-29 DIAGNOSIS — I10 ESSENTIAL HYPERTENSION: ICD-10-CM

## 2021-11-29 RX ORDER — LISINOPRIL 20 MG/1
TABLET ORAL
Qty: 90 TABLET | Refills: 0 | Status: SHIPPED | OUTPATIENT
Start: 2021-11-29

## 2021-12-14 ENCOUNTER — TELEPHONE (OUTPATIENT)
Dept: INTERNAL MEDICINE CLINIC | Facility: CLINIC | Age: 41
End: 2021-12-14

## 2021-12-14 NOTE — TELEPHONE ENCOUNTER
Pt called and stated he is trying to  two prescriptions from November and the pharmacy stated only one is covered by insurance but both medications are for cholesterol so he would like to know which one he is supposed to be taking before he goes to

## 2021-12-14 NOTE — TELEPHONE ENCOUNTER
See TE from 11/15/21. livalo was not being covered by insurance so zetia was sent. Pt is to take zetia for hyperlipidemia. LMTCB to inform pt of info above.

## 2021-12-28 ENCOUNTER — OFFICE VISIT (OUTPATIENT)
Dept: INTERNAL MEDICINE CLINIC | Facility: CLINIC | Age: 41
End: 2021-12-28

## 2021-12-28 VITALS
SYSTOLIC BLOOD PRESSURE: 120 MMHG | WEIGHT: 182 LBS | HEIGHT: 65 IN | BODY MASS INDEX: 30.32 KG/M2 | HEART RATE: 63 BPM | DIASTOLIC BLOOD PRESSURE: 82 MMHG | RESPIRATION RATE: 18 BRPM | OXYGEN SATURATION: 99 % | TEMPERATURE: 98 F

## 2021-12-28 DIAGNOSIS — M79.672 BILATERAL FOOT PAIN: Primary | ICD-10-CM

## 2021-12-28 DIAGNOSIS — M79.671 BILATERAL FOOT PAIN: Primary | ICD-10-CM

## 2021-12-28 DIAGNOSIS — J30.9 ALLERGIC RHINITIS, UNSPECIFIED SEASONALITY, UNSPECIFIED TRIGGER: ICD-10-CM

## 2021-12-28 PROCEDURE — 3008F BODY MASS INDEX DOCD: CPT | Performed by: FAMILY MEDICINE

## 2021-12-28 PROCEDURE — 99213 OFFICE O/P EST LOW 20 MIN: CPT | Performed by: FAMILY MEDICINE

## 2021-12-28 PROCEDURE — 3079F DIAST BP 80-89 MM HG: CPT | Performed by: FAMILY MEDICINE

## 2021-12-28 PROCEDURE — 3074F SYST BP LT 130 MM HG: CPT | Performed by: FAMILY MEDICINE

## 2021-12-28 NOTE — PROGRESS NOTES
CHIEF COMPLAINT:     Patient presents with: Foot Pain      HPI:   Vern Cruz is a 37year old male . The patient has complaints of bilateral foot pain.R>L.  Pain started in the spring this year after doing a lot of yard wook and being on his feet but since quitting: 3.1      Smokeless tobacco: Never Used      Tobacco comment: Pt states it is on and off he smokes    Vaping Use      Vaping Use: Never used    Alcohol use: Yes      Alcohol/week: 0.0 - 2.0 standard drinks      Comment: occ    Drug use:  Yes prescriptions requested or ordered in this encounter       Imaging & Consults:  PODIATRY - INTERNAL    PLAN:  1. Bilateral foot pain    Rest, ice application and  Elevate extremity. NSAIDs/Tylenol for pain.   Avoid kneeling, squatting, jumping and running

## 2022-02-24 ENCOUNTER — OFFICE VISIT (OUTPATIENT)
Dept: INTERNAL MEDICINE CLINIC | Facility: CLINIC | Age: 42
End: 2022-02-24
Payer: COMMERCIAL

## 2022-02-24 VITALS
HEART RATE: 73 BPM | OXYGEN SATURATION: 98 % | TEMPERATURE: 98 F | RESPIRATION RATE: 20 BRPM | HEIGHT: 65 IN | SYSTOLIC BLOOD PRESSURE: 120 MMHG | WEIGHT: 183.63 LBS | BODY MASS INDEX: 30.59 KG/M2 | DIASTOLIC BLOOD PRESSURE: 78 MMHG

## 2022-02-24 DIAGNOSIS — L65.9 HAIR THINNING: ICD-10-CM

## 2022-02-24 DIAGNOSIS — I10 ESSENTIAL HYPERTENSION: Primary | ICD-10-CM

## 2022-02-24 DIAGNOSIS — E78.00 PURE HYPERCHOLESTEROLEMIA: ICD-10-CM

## 2022-02-24 DIAGNOSIS — Z00.00 LABORATORY EXAM ORDERED AS PART OF ROUTINE GENERAL MEDICAL EXAMINATION: ICD-10-CM

## 2022-02-24 DIAGNOSIS — F41.9 ANXIETY: ICD-10-CM

## 2022-02-24 PROCEDURE — 3074F SYST BP LT 130 MM HG: CPT | Performed by: FAMILY MEDICINE

## 2022-02-24 PROCEDURE — 3008F BODY MASS INDEX DOCD: CPT | Performed by: FAMILY MEDICINE

## 2022-02-24 PROCEDURE — 3078F DIAST BP <80 MM HG: CPT | Performed by: FAMILY MEDICINE

## 2022-02-24 PROCEDURE — 99214 OFFICE O/P EST MOD 30 MIN: CPT | Performed by: FAMILY MEDICINE

## 2022-02-24 RX ORDER — ALPRAZOLAM 0.25 MG/1
0.25 TABLET ORAL NIGHTLY PRN
Qty: 30 TABLET | Refills: 1 | Status: SHIPPED | OUTPATIENT
Start: 2022-02-24

## 2022-02-24 RX ORDER — FINASTERIDE 1 MG/1
1 TABLET, FILM COATED ORAL DAILY
Qty: 90 TABLET | Refills: 1 | Status: SHIPPED | OUTPATIENT
Start: 2022-02-24

## 2022-02-24 RX ORDER — EZETIMIBE 10 MG/1
10 TABLET ORAL NIGHTLY
Qty: 90 TABLET | Refills: 1 | Status: SHIPPED | OUTPATIENT
Start: 2022-02-24

## 2022-02-24 RX ORDER — LISINOPRIL 20 MG/1
20 TABLET ORAL DAILY
Qty: 90 TABLET | Refills: 1 | Status: SHIPPED | OUTPATIENT
Start: 2022-02-24

## 2022-02-28 ENCOUNTER — LAB ENCOUNTER (OUTPATIENT)
Dept: LAB | Age: 42
End: 2022-02-28
Attending: FAMILY MEDICINE
Payer: COMMERCIAL

## 2022-02-28 DIAGNOSIS — Z00.00 LABORATORY EXAM ORDERED AS PART OF ROUTINE GENERAL MEDICAL EXAMINATION: ICD-10-CM

## 2022-02-28 LAB
ALBUMIN SERPL-MCNC: 3.9 G/DL (ref 3.4–5)
ALBUMIN/GLOB SERPL: 1.4 {RATIO} (ref 1–2)
ALP LIVER SERPL-CCNC: 45 U/L
ALT SERPL-CCNC: 52 U/L
ANION GAP SERPL CALC-SCNC: 5 MMOL/L (ref 0–18)
AST SERPL-CCNC: 29 U/L (ref 15–37)
BASOPHILS # BLD AUTO: 0.04 X10(3) UL (ref 0–0.2)
BASOPHILS NFR BLD AUTO: 1 %
BILIRUB SERPL-MCNC: 0.4 MG/DL (ref 0.1–2)
BILIRUB UR QL STRIP.AUTO: NEGATIVE
BUN BLD-MCNC: 15 MG/DL (ref 7–18)
CALCIUM BLD-MCNC: 9.2 MG/DL (ref 8.5–10.1)
CHLORIDE SERPL-SCNC: 106 MMOL/L (ref 98–112)
CHOLEST SERPL-MCNC: 239 MG/DL (ref ?–200)
CLARITY UR REFRACT.AUTO: CLEAR
CO2 SERPL-SCNC: 27 MMOL/L (ref 21–32)
COLOR UR AUTO: YELLOW
CREAT BLD-MCNC: 1.01 MG/DL
EOSINOPHIL # BLD AUTO: 0.19 X10(3) UL (ref 0–0.7)
EOSINOPHIL NFR BLD AUTO: 4.6 %
ERYTHROCYTE [DISTWIDTH] IN BLOOD BY AUTOMATED COUNT: 12 %
FASTING PATIENT LIPID ANSWER: YES
FASTING STATUS PATIENT QL REPORTED: YES
GLOBULIN PLAS-MCNC: 2.8 G/DL (ref 2.8–4.4)
GLUCOSE BLD-MCNC: 100 MG/DL (ref 70–99)
GLUCOSE UR STRIP.AUTO-MCNC: NEGATIVE MG/DL
HCT VFR BLD AUTO: 46.6 %
HDLC SERPL-MCNC: 47 MG/DL (ref 40–59)
HGB BLD-MCNC: 15.9 G/DL
IMM GRANULOCYTES # BLD AUTO: 0.02 X10(3) UL (ref 0–1)
IMM GRANULOCYTES NFR BLD: 0.5 %
KETONES UR STRIP.AUTO-MCNC: NEGATIVE MG/DL
LDLC SERPL CALC-MCNC: 169 MG/DL (ref ?–100)
LEUKOCYTE ESTERASE UR QL STRIP.AUTO: NEGATIVE
LYMPHOCYTES # BLD AUTO: 1.3 X10(3) UL (ref 1–4)
LYMPHOCYTES NFR BLD AUTO: 31.4 %
MCH RBC QN AUTO: 29.4 PG (ref 26–34)
MCHC RBC AUTO-ENTMCNC: 34.1 G/DL (ref 31–37)
MCV RBC AUTO: 86.1 FL
MONOCYTES # BLD AUTO: 0.32 X10(3) UL (ref 0.1–1)
MONOCYTES NFR BLD AUTO: 7.7 %
NEUTROPHILS # BLD AUTO: 2.27 X10 (3) UL (ref 1.5–7.7)
NEUTROPHILS # BLD AUTO: 2.27 X10(3) UL (ref 1.5–7.7)
NEUTROPHILS NFR BLD AUTO: 54.8 %
NITRITE UR QL STRIP.AUTO: NEGATIVE
NONHDLC SERPL-MCNC: 192 MG/DL (ref ?–130)
OSMOLALITY SERPL CALC.SUM OF ELEC: 287 MOSM/KG (ref 275–295)
PH UR STRIP.AUTO: 5 [PH] (ref 5–8)
PLATELET # BLD AUTO: 194 10(3)UL (ref 150–450)
POTASSIUM SERPL-SCNC: 4.3 MMOL/L (ref 3.5–5.1)
PROT SERPL-MCNC: 6.7 G/DL (ref 6.4–8.2)
PROT UR STRIP.AUTO-MCNC: NEGATIVE MG/DL
RBC # BLD AUTO: 5.41 X10(6)UL
RBC UR QL AUTO: NEGATIVE
SODIUM SERPL-SCNC: 138 MMOL/L (ref 136–145)
SP GR UR STRIP.AUTO: 1.02 (ref 1–1.03)
TRIGL SERPL-MCNC: 128 MG/DL (ref 30–149)
UROBILINOGEN UR STRIP.AUTO-MCNC: <2 MG/DL
VLDLC SERPL CALC-MCNC: 25 MG/DL (ref 0–30)
WBC # BLD AUTO: 4.1 X10(3) UL (ref 4–11)

## 2022-02-28 PROCEDURE — 80053 COMPREHEN METABOLIC PANEL: CPT

## 2022-02-28 PROCEDURE — 80061 LIPID PANEL: CPT

## 2022-02-28 PROCEDURE — 36415 COLL VENOUS BLD VENIPUNCTURE: CPT

## 2022-02-28 PROCEDURE — 82306 VITAMIN D 25 HYDROXY: CPT

## 2022-02-28 PROCEDURE — 81003 URINALYSIS AUTO W/O SCOPE: CPT

## 2022-02-28 PROCEDURE — 85025 COMPLETE CBC W/AUTO DIFF WBC: CPT

## 2022-03-01 RX ORDER — ERGOCALCIFEROL 1.25 MG/1
50000 CAPSULE ORAL WEEKLY
Qty: 4 CAPSULE | Refills: 5 | Status: SHIPPED | OUTPATIENT
Start: 2022-03-01 | End: 2022-08-16

## 2022-08-04 ENCOUNTER — HOSPITAL ENCOUNTER (OUTPATIENT)
Age: 42
Discharge: STILL ADMITTED OR ADMINISTRATIVE | End: 2022-08-04

## 2022-08-10 ENCOUNTER — TELEPHONE (OUTPATIENT)
Dept: INTERNAL MEDICINE CLINIC | Facility: CLINIC | Age: 42
End: 2022-08-10

## 2022-08-10 NOTE — TELEPHONE ENCOUNTER
Spoke to patient, symptoms started last Tuesday after eating Portillos during the day. Started having lower back tightness/soreness that comes and goes. Fatigue Thursday through Sunday. Felt better Monday-Tuesday but had some diarrhea Monday. Now isn't feeling well, diarrhea this AM, bloated and has a back ache. Denies: Vomiting, fever/chills, diet changes, has not increase fluids, traveling, dizziness, weakness, blood in stool, abdominal pain. Advised patient to increase fluids and eat a bland diet. Advised patient to seek emergent treat if develops blood in stool, temperature, unable to keep anything down. Advised to keep appointment for tomorrow. Patient verbalized understanding.

## 2022-08-10 NOTE — TELEPHONE ENCOUNTER
Pt stated his stomach is upset for this couple days, he is having some diarrhea, but also constipation, he feels some what nauseous and bloated, his back started to hurt and it helped him when he laid down, but now its the opposite laying down does not help,\" pt mentioned is a weird feeling, he just not feels right. \" Pt wanted to come in today for an appointment with dr. Trina Aase but there Is not openings and rogelio MCLEAN is not here, pt does have an appointment for his px tomorrow with dr. Trina Aase, is it ok for pt to come in tomorrow or should he be treated today, pt is requesting to talk to the nurse.

## 2022-08-11 ENCOUNTER — OFFICE VISIT (OUTPATIENT)
Dept: INTERNAL MEDICINE CLINIC | Facility: CLINIC | Age: 42
End: 2022-08-11
Payer: COMMERCIAL

## 2022-08-11 VITALS
DIASTOLIC BLOOD PRESSURE: 80 MMHG | OXYGEN SATURATION: 97 % | WEIGHT: 179.38 LBS | BODY MASS INDEX: 30.25 KG/M2 | TEMPERATURE: 98 F | HEART RATE: 68 BPM | SYSTOLIC BLOOD PRESSURE: 118 MMHG | HEIGHT: 64.75 IN

## 2022-08-11 DIAGNOSIS — Z00.00 LABORATORY EXAM ORDERED AS PART OF ROUTINE GENERAL MEDICAL EXAMINATION: ICD-10-CM

## 2022-08-11 DIAGNOSIS — F41.9 ANXIETY: ICD-10-CM

## 2022-08-11 DIAGNOSIS — I10 ESSENTIAL HYPERTENSION: ICD-10-CM

## 2022-08-11 DIAGNOSIS — Z00.00 WELLNESS EXAMINATION: Primary | ICD-10-CM

## 2022-08-11 DIAGNOSIS — L65.9 HAIR THINNING: ICD-10-CM

## 2022-08-11 DIAGNOSIS — E78.00 PURE HYPERCHOLESTEROLEMIA: ICD-10-CM

## 2022-08-11 PROCEDURE — 3079F DIAST BP 80-89 MM HG: CPT | Performed by: FAMILY MEDICINE

## 2022-08-11 PROCEDURE — 3074F SYST BP LT 130 MM HG: CPT | Performed by: FAMILY MEDICINE

## 2022-08-11 PROCEDURE — 3008F BODY MASS INDEX DOCD: CPT | Performed by: FAMILY MEDICINE

## 2022-08-11 PROCEDURE — 99396 PREV VISIT EST AGE 40-64: CPT | Performed by: FAMILY MEDICINE

## 2022-08-11 RX ORDER — EZETIMIBE 10 MG/1
10 TABLET ORAL NIGHTLY
Qty: 90 TABLET | Refills: 1 | Status: SHIPPED | OUTPATIENT
Start: 2022-08-11

## 2022-08-11 RX ORDER — FINASTERIDE 1 MG/1
1 TABLET, FILM COATED ORAL DAILY
Qty: 90 TABLET | Refills: 1 | Status: SHIPPED | OUTPATIENT
Start: 2022-08-11

## 2022-08-11 RX ORDER — LISINOPRIL 20 MG/1
20 TABLET ORAL DAILY
Qty: 90 TABLET | Refills: 1 | Status: SHIPPED | OUTPATIENT
Start: 2022-08-11

## 2022-08-11 RX ORDER — ALPRAZOLAM 0.25 MG/1
0.25 TABLET ORAL NIGHTLY PRN
Qty: 30 TABLET | Refills: 1 | Status: SHIPPED | OUTPATIENT
Start: 2022-08-11

## 2022-08-31 ENCOUNTER — LAB ENCOUNTER (OUTPATIENT)
Dept: LAB | Age: 42
End: 2022-08-31
Attending: FAMILY MEDICINE
Payer: COMMERCIAL

## 2022-08-31 DIAGNOSIS — E55.9 VITAMIN D DEFICIENCY: ICD-10-CM

## 2022-08-31 DIAGNOSIS — Z00.00 WELLNESS EXAMINATION: ICD-10-CM

## 2022-08-31 DIAGNOSIS — Z00.00 LABORATORY EXAM ORDERED AS PART OF ROUTINE GENERAL MEDICAL EXAMINATION: ICD-10-CM

## 2022-08-31 DIAGNOSIS — E78.00 PURE HYPERCHOLESTEROLEMIA: ICD-10-CM

## 2022-08-31 LAB
ALBUMIN SERPL-MCNC: 4 G/DL (ref 3.4–5)
ALBUMIN/GLOB SERPL: 1.4 {RATIO} (ref 1–2)
ALP LIVER SERPL-CCNC: 54 U/L
ALT SERPL-CCNC: 82 U/L
ANION GAP SERPL CALC-SCNC: 3 MMOL/L (ref 0–18)
AST SERPL-CCNC: 29 U/L (ref 15–37)
BASOPHILS # BLD AUTO: 0.04 X10(3) UL (ref 0–0.2)
BASOPHILS NFR BLD AUTO: 0.8 %
BILIRUB SERPL-MCNC: 0.4 MG/DL (ref 0.1–2)
BILIRUB UR QL STRIP.AUTO: NEGATIVE
BUN BLD-MCNC: 15 MG/DL (ref 7–18)
CALCIUM BLD-MCNC: 9.1 MG/DL (ref 8.5–10.1)
CHLORIDE SERPL-SCNC: 107 MMOL/L (ref 98–112)
CHOLEST SERPL-MCNC: 189 MG/DL (ref ?–200)
CLARITY UR REFRACT.AUTO: CLEAR
CO2 SERPL-SCNC: 27 MMOL/L (ref 21–32)
COLOR UR AUTO: YELLOW
CREAT BLD-MCNC: 1.01 MG/DL
EOSINOPHIL # BLD AUTO: 0.25 X10(3) UL (ref 0–0.7)
EOSINOPHIL NFR BLD AUTO: 5.2 %
ERYTHROCYTE [DISTWIDTH] IN BLOOD BY AUTOMATED COUNT: 11.4 %
FASTING PATIENT LIPID ANSWER: YES
FASTING STATUS PATIENT QL REPORTED: YES
GFR SERPLBLD BASED ON 1.73 SQ M-ARVRAT: 96 ML/MIN/1.73M2 (ref 60–?)
GLOBULIN PLAS-MCNC: 2.9 G/DL (ref 2.8–4.4)
GLUCOSE BLD-MCNC: 97 MG/DL (ref 70–99)
GLUCOSE UR STRIP.AUTO-MCNC: NEGATIVE MG/DL
HCT VFR BLD AUTO: 47.1 %
HDLC SERPL-MCNC: 42 MG/DL (ref 40–59)
HGB BLD-MCNC: 15.9 G/DL
IMM GRANULOCYTES # BLD AUTO: 0.03 X10(3) UL (ref 0–1)
IMM GRANULOCYTES NFR BLD: 0.6 %
KETONES UR STRIP.AUTO-MCNC: NEGATIVE MG/DL
LDLC SERPL CALC-MCNC: 111 MG/DL (ref ?–100)
LEUKOCYTE ESTERASE UR QL STRIP.AUTO: NEGATIVE
LYMPHOCYTES # BLD AUTO: 1.54 X10(3) UL (ref 1–4)
LYMPHOCYTES NFR BLD AUTO: 31.8 %
MCH RBC QN AUTO: 29 PG (ref 26–34)
MCHC RBC AUTO-ENTMCNC: 33.8 G/DL (ref 31–37)
MCV RBC AUTO: 85.9 FL
MONOCYTES # BLD AUTO: 0.33 X10(3) UL (ref 0.1–1)
MONOCYTES NFR BLD AUTO: 6.8 %
NEUTROPHILS # BLD AUTO: 2.66 X10 (3) UL (ref 1.5–7.7)
NEUTROPHILS # BLD AUTO: 2.66 X10(3) UL (ref 1.5–7.7)
NEUTROPHILS NFR BLD AUTO: 54.8 %
NITRITE UR QL STRIP.AUTO: NEGATIVE
NONHDLC SERPL-MCNC: 147 MG/DL (ref ?–130)
OSMOLALITY SERPL CALC.SUM OF ELEC: 285 MOSM/KG (ref 275–295)
PH UR STRIP.AUTO: 7.5 [PH] (ref 5–8)
PLATELET # BLD AUTO: 213 10(3)UL (ref 150–450)
POTASSIUM SERPL-SCNC: 4.2 MMOL/L (ref 3.5–5.1)
PROT SERPL-MCNC: 6.9 G/DL (ref 6.4–8.2)
PROT UR STRIP.AUTO-MCNC: NEGATIVE MG/DL
RBC # BLD AUTO: 5.48 X10(6)UL
RBC UR QL AUTO: NEGATIVE
SODIUM SERPL-SCNC: 137 MMOL/L (ref 136–145)
SP GR UR STRIP.AUTO: 1.01 (ref 1–1.03)
TRIGL SERPL-MCNC: 204 MG/DL (ref 30–149)
TSI SER-ACNC: 1.49 MIU/ML (ref 0.36–3.74)
UROBILINOGEN UR STRIP.AUTO-MCNC: 0.2 MG/DL
VIT D+METAB SERPL-MCNC: 24 NG/ML (ref 30–100)
VLDLC SERPL CALC-MCNC: 35 MG/DL (ref 0–30)
WBC # BLD AUTO: 4.9 X10(3) UL (ref 4–11)

## 2022-08-31 PROCEDURE — 36415 COLL VENOUS BLD VENIPUNCTURE: CPT

## 2022-08-31 PROCEDURE — 82306 VITAMIN D 25 HYDROXY: CPT

## 2022-08-31 PROCEDURE — 85025 COMPLETE CBC W/AUTO DIFF WBC: CPT

## 2022-08-31 PROCEDURE — 80053 COMPREHEN METABOLIC PANEL: CPT

## 2022-08-31 PROCEDURE — 81003 URINALYSIS AUTO W/O SCOPE: CPT

## 2022-08-31 PROCEDURE — 80061 LIPID PANEL: CPT

## 2022-08-31 PROCEDURE — 84443 ASSAY THYROID STIM HORMONE: CPT

## 2022-09-06 ENCOUNTER — TELEPHONE (OUTPATIENT)
Dept: INTERNAL MEDICINE CLINIC | Facility: CLINIC | Age: 42
End: 2022-09-06

## 2022-09-06 DIAGNOSIS — R74.8 ELEVATED LIVER ENZYMES: Primary | ICD-10-CM

## 2022-09-06 DIAGNOSIS — E55.9 VITAMIN D DEFICIENCY: ICD-10-CM

## 2022-09-06 RX ORDER — ERGOCALCIFEROL 1.25 MG/1
50000 CAPSULE ORAL WEEKLY
Qty: 12 CAPSULE | Refills: 1 | Status: SHIPPED | OUTPATIENT
Start: 2022-09-06

## 2022-09-06 NOTE — TELEPHONE ENCOUNTER
----- Message from Katiana Pearl MD sent at 8/31/2022  4:30 PM CDT -----  Cholesterol much better   thyroid normal   sugar normal  One LFT up slightly up   rec aston AST/ALT 1 mo  Low vit D  rec vit D [id number] weekly #12 1r  aston 6 mo  Otherwise OK w labs

## 2022-10-26 ENCOUNTER — LAB ENCOUNTER (OUTPATIENT)
Dept: LAB | Age: 42
End: 2022-10-26
Attending: FAMILY MEDICINE
Payer: COMMERCIAL

## 2022-10-26 DIAGNOSIS — R74.8 ELEVATED LIVER ENZYMES: ICD-10-CM

## 2022-10-26 LAB
ALT SERPL-CCNC: 53 U/L
AST SERPL-CCNC: 34 U/L (ref 15–37)

## 2022-10-26 PROCEDURE — 84450 TRANSFERASE (AST) (SGOT): CPT | Performed by: FAMILY MEDICINE

## 2022-10-26 PROCEDURE — 84460 ALANINE AMINO (ALT) (SGPT): CPT | Performed by: FAMILY MEDICINE

## 2023-01-03 ENCOUNTER — TELEMEDICINE (OUTPATIENT)
Dept: INTERNAL MEDICINE CLINIC | Facility: CLINIC | Age: 43
End: 2023-01-03
Payer: COMMERCIAL

## 2023-01-03 DIAGNOSIS — L30.9 DERMATITIS: Primary | ICD-10-CM

## 2023-01-03 DIAGNOSIS — I10 ESSENTIAL HYPERTENSION: ICD-10-CM

## 2023-01-03 PROCEDURE — 99213 OFFICE O/P EST LOW 20 MIN: CPT | Performed by: FAMILY MEDICINE

## 2023-01-03 RX ORDER — METHYLPREDNISOLONE 4 MG/1
TABLET ORAL
Qty: 1 EACH | Refills: 0 | Status: SHIPPED | OUTPATIENT
Start: 2023-01-03

## 2023-01-03 RX ORDER — TRIAMCINOLONE ACETONIDE 1 MG/G
CREAM TOPICAL 2 TIMES DAILY PRN
Qty: 45 G | Refills: 1 | Status: SHIPPED | OUTPATIENT
Start: 2023-01-03

## 2023-01-04 RX ORDER — LISINOPRIL 20 MG/1
TABLET ORAL
Qty: 30 TABLET | Refills: 0 | Status: SHIPPED | OUTPATIENT
Start: 2023-01-04

## 2023-02-21 DIAGNOSIS — I10 ESSENTIAL HYPERTENSION: ICD-10-CM

## 2023-02-22 RX ORDER — LISINOPRIL 20 MG/1
TABLET ORAL
Qty: 90 TABLET | Refills: 0 | Status: SHIPPED | OUTPATIENT
Start: 2023-02-22

## 2023-02-22 NOTE — TELEPHONE ENCOUNTER
Pt scheduled apt.     Future Appointments   Date Time Provider Jayshree Larkin   2/24/2023  7:30 AM Chaz Hernandez MD [facility] [place]

## 2023-02-24 ENCOUNTER — OFFICE VISIT (OUTPATIENT)
Dept: INTERNAL MEDICINE CLINIC | Facility: CLINIC | Age: 43
End: 2023-02-24
Payer: COMMERCIAL

## 2023-02-24 VITALS
BODY MASS INDEX: 31.31 KG/M2 | SYSTOLIC BLOOD PRESSURE: 110 MMHG | WEIGHT: 185.63 LBS | DIASTOLIC BLOOD PRESSURE: 78 MMHG | TEMPERATURE: 98 F | HEART RATE: 81 BPM | HEIGHT: 64.75 IN | OXYGEN SATURATION: 98 %

## 2023-02-24 DIAGNOSIS — I10 ESSENTIAL HYPERTENSION: Primary | ICD-10-CM

## 2023-02-24 DIAGNOSIS — F41.9 ANXIETY: ICD-10-CM

## 2023-02-24 DIAGNOSIS — E66.9 OBESITY (BMI 30-39.9): ICD-10-CM

## 2023-02-24 DIAGNOSIS — E78.00 PURE HYPERCHOLESTEROLEMIA: ICD-10-CM

## 2023-02-24 DIAGNOSIS — L65.9 HAIR THINNING: ICD-10-CM

## 2023-02-24 PROCEDURE — 3078F DIAST BP <80 MM HG: CPT | Performed by: FAMILY MEDICINE

## 2023-02-24 PROCEDURE — 3008F BODY MASS INDEX DOCD: CPT | Performed by: FAMILY MEDICINE

## 2023-02-24 PROCEDURE — 3074F SYST BP LT 130 MM HG: CPT | Performed by: FAMILY MEDICINE

## 2023-02-24 PROCEDURE — 99214 OFFICE O/P EST MOD 30 MIN: CPT | Performed by: FAMILY MEDICINE

## 2023-02-24 RX ORDER — TRIAMCINOLONE ACETONIDE 1 MG/G
CREAM TOPICAL 2 TIMES DAILY PRN
Qty: 45 G | Refills: 1 | Status: SHIPPED | OUTPATIENT
Start: 2023-02-24

## 2023-02-24 RX ORDER — EZETIMIBE 10 MG/1
10 TABLET ORAL NIGHTLY
Qty: 90 TABLET | Refills: 1 | Status: SHIPPED | OUTPATIENT
Start: 2023-02-24

## 2023-02-24 RX ORDER — FINASTERIDE 1 MG/1
1 TABLET, FILM COATED ORAL DAILY
Qty: 90 TABLET | Refills: 1 | Status: SHIPPED | OUTPATIENT
Start: 2023-02-24

## 2023-02-24 RX ORDER — ALPRAZOLAM 0.25 MG/1
0.25 TABLET ORAL NIGHTLY PRN
Qty: 30 TABLET | Refills: 1 | Status: SHIPPED | OUTPATIENT
Start: 2023-02-24

## 2023-05-06 DIAGNOSIS — E78.00 PURE HYPERCHOLESTEROLEMIA: ICD-10-CM

## 2023-05-06 DIAGNOSIS — I10 ESSENTIAL HYPERTENSION: ICD-10-CM

## 2023-05-08 RX ORDER — LISINOPRIL 20 MG/1
TABLET ORAL
Qty: 90 TABLET | Refills: 0 | Status: SHIPPED | OUTPATIENT
Start: 2023-05-08

## 2023-05-08 RX ORDER — EZETIMIBE 10 MG/1
TABLET ORAL
Qty: 90 TABLET | Refills: 0 | Status: SHIPPED | OUTPATIENT
Start: 2023-05-08

## 2023-08-10 ENCOUNTER — OFFICE VISIT (OUTPATIENT)
Dept: INTERNAL MEDICINE CLINIC | Facility: CLINIC | Age: 43
End: 2023-08-10
Payer: COMMERCIAL

## 2023-08-10 ENCOUNTER — LAB ENCOUNTER (OUTPATIENT)
Dept: LAB | Age: 43
End: 2023-08-10
Attending: FAMILY MEDICINE
Payer: COMMERCIAL

## 2023-08-10 VITALS
SYSTOLIC BLOOD PRESSURE: 112 MMHG | DIASTOLIC BLOOD PRESSURE: 76 MMHG | OXYGEN SATURATION: 98 % | WEIGHT: 179.63 LBS | HEART RATE: 87 BPM | HEIGHT: 65 IN | TEMPERATURE: 98 F | BODY MASS INDEX: 29.93 KG/M2

## 2023-08-10 DIAGNOSIS — E78.00 PURE HYPERCHOLESTEROLEMIA: ICD-10-CM

## 2023-08-10 DIAGNOSIS — I10 ESSENTIAL HYPERTENSION: ICD-10-CM

## 2023-08-10 DIAGNOSIS — G89.29 CHRONIC PAIN OF BOTH FEET: ICD-10-CM

## 2023-08-10 DIAGNOSIS — Z00.00 LABORATORY EXAM ORDERED AS PART OF ROUTINE GENERAL MEDICAL EXAMINATION: ICD-10-CM

## 2023-08-10 DIAGNOSIS — M79.672 CHRONIC PAIN OF BOTH FEET: ICD-10-CM

## 2023-08-10 DIAGNOSIS — R07.9 CHEST PAIN, UNSPECIFIED TYPE: ICD-10-CM

## 2023-08-10 DIAGNOSIS — M79.671 CHRONIC PAIN OF BOTH FEET: ICD-10-CM

## 2023-08-10 DIAGNOSIS — Z00.00 WELLNESS EXAMINATION: ICD-10-CM

## 2023-08-10 DIAGNOSIS — I49.9 IRREGULAR HEART RATE: ICD-10-CM

## 2023-08-10 DIAGNOSIS — Z00.00 WELLNESS EXAMINATION: Primary | ICD-10-CM

## 2023-08-10 LAB
ALBUMIN SERPL-MCNC: 4.1 G/DL (ref 3.4–5)
ALBUMIN/GLOB SERPL: 1.3 {RATIO} (ref 1–2)
ALP LIVER SERPL-CCNC: 51 U/L
ALT SERPL-CCNC: 54 U/L
ANION GAP SERPL CALC-SCNC: 3 MMOL/L (ref 0–18)
AST SERPL-CCNC: 37 U/L (ref 15–37)
BASOPHILS # BLD AUTO: 0.05 X10(3) UL (ref 0–0.2)
BASOPHILS NFR BLD AUTO: 1 %
BILIRUB SERPL-MCNC: 0.5 MG/DL (ref 0.1–2)
BILIRUB UR QL STRIP.AUTO: NEGATIVE
BUN BLD-MCNC: 13 MG/DL (ref 7–18)
CALCIUM BLD-MCNC: 9.1 MG/DL (ref 8.5–10.1)
CHLORIDE SERPL-SCNC: 106 MMOL/L (ref 98–112)
CHOLEST SERPL-MCNC: 200 MG/DL (ref ?–200)
CLARITY UR REFRACT.AUTO: CLEAR
CO2 SERPL-SCNC: 28 MMOL/L (ref 21–32)
COLOR UR AUTO: YELLOW
CREAT BLD-MCNC: 1.11 MG/DL
EGFRCR SERPLBLD CKD-EPI 2021: 85 ML/MIN/1.73M2 (ref 60–?)
EOSINOPHIL # BLD AUTO: 0.13 X10(3) UL (ref 0–0.7)
EOSINOPHIL NFR BLD AUTO: 2.7 %
ERYTHROCYTE [DISTWIDTH] IN BLOOD BY AUTOMATED COUNT: 11.8 %
FASTING PATIENT LIPID ANSWER: YES
FASTING STATUS PATIENT QL REPORTED: YES
GLOBULIN PLAS-MCNC: 3.2 G/DL (ref 2.8–4.4)
GLUCOSE BLD-MCNC: 83 MG/DL (ref 70–99)
GLUCOSE UR STRIP.AUTO-MCNC: NEGATIVE MG/DL
HCT VFR BLD AUTO: 48.8 %
HDLC SERPL-MCNC: 44 MG/DL (ref 40–59)
HGB BLD-MCNC: 16.5 G/DL
IMM GRANULOCYTES # BLD AUTO: 0.03 X10(3) UL (ref 0–1)
IMM GRANULOCYTES NFR BLD: 0.6 %
KETONES UR STRIP.AUTO-MCNC: NEGATIVE MG/DL
LDLC SERPL CALC-MCNC: 116 MG/DL (ref ?–100)
LEUKOCYTE ESTERASE UR QL STRIP.AUTO: NEGATIVE
LYMPHOCYTES # BLD AUTO: 1.24 X10(3) UL (ref 1–4)
LYMPHOCYTES NFR BLD AUTO: 25.9 %
MCH RBC QN AUTO: 29.4 PG (ref 26–34)
MCHC RBC AUTO-ENTMCNC: 33.8 G/DL (ref 31–37)
MCV RBC AUTO: 86.8 FL
MONOCYTES # BLD AUTO: 0.36 X10(3) UL (ref 0.1–1)
MONOCYTES NFR BLD AUTO: 7.5 %
NEUTROPHILS # BLD AUTO: 2.98 X10 (3) UL (ref 1.5–7.7)
NEUTROPHILS # BLD AUTO: 2.98 X10(3) UL (ref 1.5–7.7)
NEUTROPHILS NFR BLD AUTO: 62.3 %
NITRITE UR QL STRIP.AUTO: NEGATIVE
NONHDLC SERPL-MCNC: 156 MG/DL (ref ?–130)
OSMOLALITY SERPL CALC.SUM OF ELEC: 283 MOSM/KG (ref 275–295)
PH UR STRIP.AUTO: 7 [PH] (ref 5–8)
PLATELET # BLD AUTO: 206 10(3)UL (ref 150–450)
POTASSIUM SERPL-SCNC: 4 MMOL/L (ref 3.5–5.1)
PROT SERPL-MCNC: 7.3 G/DL (ref 6.4–8.2)
PROT UR STRIP.AUTO-MCNC: NEGATIVE MG/DL
RBC # BLD AUTO: 5.62 X10(6)UL
RBC UR QL AUTO: NEGATIVE
SODIUM SERPL-SCNC: 137 MMOL/L (ref 136–145)
SP GR UR STRIP.AUTO: 1.01 (ref 1–1.03)
TRIGL SERPL-MCNC: 232 MG/DL (ref 30–149)
TSI SER-ACNC: 1.17 MIU/ML (ref 0.36–3.74)
UROBILINOGEN UR STRIP.AUTO-MCNC: <2 MG/DL
VIT D+METAB SERPL-MCNC: 61.7 NG/ML (ref 30–100)
VLDLC SERPL CALC-MCNC: 41 MG/DL (ref 0–30)
WBC # BLD AUTO: 4.8 X10(3) UL (ref 4–11)

## 2023-08-10 PROCEDURE — 36415 COLL VENOUS BLD VENIPUNCTURE: CPT

## 2023-08-10 PROCEDURE — 80053 COMPREHEN METABOLIC PANEL: CPT

## 2023-08-10 PROCEDURE — 82306 VITAMIN D 25 HYDROXY: CPT

## 2023-08-10 PROCEDURE — 3074F SYST BP LT 130 MM HG: CPT | Performed by: FAMILY MEDICINE

## 2023-08-10 PROCEDURE — 80061 LIPID PANEL: CPT

## 2023-08-10 PROCEDURE — 3078F DIAST BP <80 MM HG: CPT | Performed by: FAMILY MEDICINE

## 2023-08-10 PROCEDURE — 85025 COMPLETE CBC W/AUTO DIFF WBC: CPT

## 2023-08-10 PROCEDURE — 99213 OFFICE O/P EST LOW 20 MIN: CPT | Performed by: FAMILY MEDICINE

## 2023-08-10 PROCEDURE — 84443 ASSAY THYROID STIM HORMONE: CPT

## 2023-08-10 PROCEDURE — 99396 PREV VISIT EST AGE 40-64: CPT | Performed by: FAMILY MEDICINE

## 2023-08-10 PROCEDURE — 81003 URINALYSIS AUTO W/O SCOPE: CPT

## 2023-08-10 PROCEDURE — 3008F BODY MASS INDEX DOCD: CPT | Performed by: FAMILY MEDICINE

## 2023-08-10 RX ORDER — EZETIMIBE 10 MG/1
10 TABLET ORAL NIGHTLY
Qty: 90 TABLET | Refills: 1 | Status: SHIPPED | OUTPATIENT
Start: 2023-08-10

## 2023-08-10 RX ORDER — LISINOPRIL 20 MG/1
TABLET ORAL
Qty: 90 TABLET | Refills: 0 | Status: SHIPPED | OUTPATIENT
Start: 2023-08-10

## 2023-08-10 NOTE — TELEPHONE ENCOUNTER
Requesting    Name from pharmacy: LISINOPRIL 20MG TABLETS         Will file in chart as: LISINOPRIL 20 MG Oral Tab    Sig: TAKE 1 TABLET BY MOUTH DAILY    Disp: 90 tablet    Refills: 0 (Pharmacy requested: Not specified)    Start: 8/10/2023    Class: Normal    Non-formulary For: Essential hypertension    Last ordered: 3 months ago by Cristina Kumar MD Last refill: 5/8/2023    Rx #: [id number]    Hypertension Medications Protocol Jdtqyc60/10/2023 05:43 AM   Protocol Details CMP or BMP in past 12 months    Last serum creatinine< 2.0    Appointment in past 6 or next 3 months        LOV: 2/24/2023  RTC: 6 months   Last Relevant Labs: 8/31/2022  Filled: 5/8/2023 #90 with 0 refills    Future Appointments   Date Time Provider Jayshree Larkin   8/10/2023 11:00 AM Gasper Fuller MD [facility] [place]

## 2023-10-09 DIAGNOSIS — E78.00 PURE HYPERCHOLESTEROLEMIA: ICD-10-CM

## 2023-10-09 RX ORDER — EZETIMIBE 10 MG/1
10 TABLET ORAL NIGHTLY
Qty: 90 TABLET | Refills: 1 | OUTPATIENT
Start: 2023-10-09

## 2023-11-08 DIAGNOSIS — I10 ESSENTIAL HYPERTENSION: ICD-10-CM

## 2023-11-08 RX ORDER — LISINOPRIL 20 MG/1
TABLET ORAL
Qty: 90 TABLET | Refills: 0 | Status: SHIPPED | OUTPATIENT
Start: 2023-11-08

## 2023-11-08 NOTE — TELEPHONE ENCOUNTER
Requesting    Name from pharmacy: LISINOPRIL 20MG TABLETS         Will file in chart as: LISINOPRIL 20 MG Oral Tab    Sig: TAKE 1 TABLET BY MOUTH DAILY    Disp: 90 tablet    Refills: 0 (Pharmacy requested: Not specified)    Start: 11/8/2023    Class: Normal    Non-formulary For: Essential hypertension    Last ordered: 3 months ago (8/10/2023) by Zandra Siemens, MD    Last refill: 8/10/2023    Rx #: [id number]    Hypertension Medications Protocol [id number]/08/2023 05:44 AM   Protocol Details CMP or BMP in past 12 months    Last serum creatinine< 2.0    Appointment in past 6 or next 3 months        LOV: 8/10/2023  RTC: 1 year   Last Relevant Labs: 8/10/2023  Filled: 8/10/2023 #90 with 0 refills    No future appointments.

## 2023-11-09 DIAGNOSIS — E55.9 VITAMIN D DEFICIENCY: ICD-10-CM

## 2023-11-09 RX ORDER — ERGOCALCIFEROL 1.25 MG/1
50000 CAPSULE ORAL WEEKLY
Qty: 12 CAPSULE | Refills: 1 | OUTPATIENT
Start: 2023-11-09

## 2023-11-09 NOTE — TELEPHONE ENCOUNTER
Ergocalciferol   Filled 9-6-22  Qty 12  1 refill  No future appointments.   LOV 8-10-23 TO  Vit D- normal 8-10-23

## 2023-11-21 DIAGNOSIS — L65.9 HAIR THINNING: ICD-10-CM

## 2023-11-22 RX ORDER — FINASTERIDE 1 MG/1
1 TABLET, FILM COATED ORAL DAILY
Qty: 90 TABLET | Refills: 1 | Status: SHIPPED | OUTPATIENT
Start: 2023-11-22

## 2023-11-22 NOTE — TELEPHONE ENCOUNTER
Requesting    Name from pharmacy: FINASTERIDE 1MG TABLETS         Will file in chart as: FINASTERIDE 1 MG Oral Tab    Sig: TAKE 1 TABLET(1 MG) BY MOUTH DAILY    Disp: 90 tablet    Refills: 1 (Pharmacy requested: Not specified)    Start: 11/21/2023    Class: Normal    Non-formulary For: Hair thinning    Last ordered: 9 months ago (2/24/2023) by Kailash Ziegler MD    Last refill: 8/14/2023     LOV: 8/10/2023  RTC: 6-12 months  Last Relevant Labs: n/a   Filled: 2/24/2023 #90 with 1 refills    No future appointments.

## 2023-12-09 ENCOUNTER — OFFICE VISIT (OUTPATIENT)
Dept: INTERNAL MEDICINE CLINIC | Facility: CLINIC | Age: 43
End: 2023-12-09
Payer: COMMERCIAL

## 2023-12-09 VITALS
HEART RATE: 67 BPM | DIASTOLIC BLOOD PRESSURE: 84 MMHG | SYSTOLIC BLOOD PRESSURE: 120 MMHG | WEIGHT: 182.19 LBS | OXYGEN SATURATION: 97 % | TEMPERATURE: 98 F | BODY MASS INDEX: 30.35 KG/M2 | HEIGHT: 65 IN

## 2023-12-09 DIAGNOSIS — R10.10 UPPER ABDOMINAL PAIN: Primary | ICD-10-CM

## 2023-12-09 PROCEDURE — 3079F DIAST BP 80-89 MM HG: CPT | Performed by: FAMILY MEDICINE

## 2023-12-09 PROCEDURE — 3008F BODY MASS INDEX DOCD: CPT | Performed by: FAMILY MEDICINE

## 2023-12-09 PROCEDURE — 99213 OFFICE O/P EST LOW 20 MIN: CPT | Performed by: FAMILY MEDICINE

## 2023-12-09 PROCEDURE — 3074F SYST BP LT 130 MM HG: CPT | Performed by: FAMILY MEDICINE

## 2024-02-06 DIAGNOSIS — I10 ESSENTIAL HYPERTENSION: ICD-10-CM

## 2024-02-06 RX ORDER — LISINOPRIL 20 MG/1
TABLET ORAL
Qty: 90 TABLET | Refills: 0 | Status: SHIPPED | OUTPATIENT
Start: 2024-02-06

## 2024-02-23 ENCOUNTER — OFFICE VISIT (OUTPATIENT)
Dept: INTERNAL MEDICINE CLINIC | Facility: CLINIC | Age: 44
End: 2024-02-23
Payer: COMMERCIAL

## 2024-02-23 VITALS
HEART RATE: 80 BPM | OXYGEN SATURATION: 97 % | DIASTOLIC BLOOD PRESSURE: 80 MMHG | TEMPERATURE: 98 F | WEIGHT: 184.19 LBS | SYSTOLIC BLOOD PRESSURE: 112 MMHG | BODY MASS INDEX: 30.69 KG/M2 | HEIGHT: 65 IN

## 2024-02-23 DIAGNOSIS — J31.0 RHINITIS, UNSPECIFIED TYPE: Primary | ICD-10-CM

## 2024-02-23 PROCEDURE — 99213 OFFICE O/P EST LOW 20 MIN: CPT | Performed by: FAMILY MEDICINE

## 2024-02-23 NOTE — PROGRESS NOTES
Marco A Joshi is a 44 year old male.  HPI:   5d ago noted nasal congestion   had been remodeling the bathroom    was sharmaine    not aware of any URI s/s in the family    occ cough   no fevers   +phlegm    no sob    wonders if dust related     perhaps a bit better today      + runny nose  Had been on flonase but stopped it ust prior it to the onset of her s/s         Current Outpatient Medications   Medication Sig Dispense Refill    LISINOPRIL 20 MG Oral Tab TAKE 1 TABLET BY MOUTH DAILY 90 tablet 0    finasteride 1 MG Oral Tab Take 1 tablet (1 mg total) by mouth daily. 90 tablet 1    ezetimibe 10 MG Oral Tab Take 1 tablet (10 mg total) by mouth nightly. 90 tablet 1    ALPRAZolam 0.25 MG Oral Tab Take 1 tablet (0.25 mg total) by mouth nightly as needed for Sleep. 30 tablet 1    clotrimazole-betamethasone 1-0.05 % External Cream Apply 1 Application topically 2 (two) times daily as needed. 60 g 0    loratadine 10 MG Oral Tab Take 1 tablet (10 mg total) by mouth daily.      triamcinolone 0.1 % External Cream Apply topically 2 (two) times daily as needed. (Patient not taking: Reported on 2024) 45 g 1    ergocalciferol 1.25 MG (84012 UT) Oral Cap Take 1 capsule (50,000 Units total) by mouth once a week. (Patient not taking: Reported on 2023) 12 capsule 1    Minoxidil 2 % External Solution Apply topically 2 (two) times daily. (Patient not taking: Reported on 8/10/2023)  0      Past Medical History:   Diagnosis Date    Chest pain Oct 2016    nl cath    Essential hypertension     Hyperlipidemia       Social History:  Social History     Socioeconomic History    Marital status: Single   Tobacco Use    Smoking status: Former     Years: 26     Types: Cigarettes     Quit date: 10/30/2018     Years since quittin.3    Smokeless tobacco: Never    Tobacco comments:     Pt states it is on and off he smokes   Vaping Use    Vaping Use: Never used   Substance and Sexual Activity    Alcohol use: Yes     Alcohol/week: 0.0 -  2.0 standard drinks of alcohol     Comment: occ    Drug use: Yes     Types: Cannabis     Comment: occ - edibles   Other Topics Concern    Caffeine Concern Yes     Comment: 2 oz daily    Exercise No        REVIEW OF SYSTEMS:   GENERAL HEALTH: feels well otherwise       EXAM:   /80 (BP Location: Left arm, Patient Position: Sitting, Cuff Size: large)   Pulse 80   Temp 98.4 °F (36.9 °C) (Temporal)   Ht 5' 5\" (1.651 m)   Wt 184 lb 3.2 oz (83.6 kg)   SpO2 97%   BMI 30.65 kg/m²   GENERAL: well developed, well nourished,in no apparent distress  SKIN: no rashes  TMs nl  throat nl    NECK: supple,no adenopathy  LUNGS: clear to auscultation       ASSESSMENT AND PLAN:   1. Rhinitis, unspecified type  I suspect it is more related to stopping the flonase as well as the remodeling and dust   less likely URI      rec get back on the flonase   OK to use 1 spray daily    call if problems          The patient indicates understanding of these issues and agrees to the plan.  .

## 2024-03-21 DIAGNOSIS — L65.9 HAIR THINNING: ICD-10-CM

## 2024-03-21 RX ORDER — FINASTERIDE 1 MG/1
1 TABLET, FILM COATED ORAL DAILY
Qty: 90 TABLET | Refills: 1 | Status: SHIPPED | OUTPATIENT
Start: 2024-03-21

## 2024-04-18 DIAGNOSIS — E78.00 PURE HYPERCHOLESTEROLEMIA: ICD-10-CM

## 2024-04-18 RX ORDER — EZETIMIBE 10 MG/1
10 TABLET ORAL EVERY EVENING
Qty: 90 TABLET | Refills: 0 | Status: SHIPPED | OUTPATIENT
Start: 2024-04-18

## 2024-05-06 ENCOUNTER — OFFICE VISIT (OUTPATIENT)
Dept: INTERNAL MEDICINE CLINIC | Facility: CLINIC | Age: 44
End: 2024-05-06
Payer: COMMERCIAL

## 2024-05-06 VITALS
DIASTOLIC BLOOD PRESSURE: 80 MMHG | SYSTOLIC BLOOD PRESSURE: 124 MMHG | OXYGEN SATURATION: 98 % | TEMPERATURE: 98 F | BODY MASS INDEX: 30.49 KG/M2 | HEIGHT: 65 IN | HEART RATE: 75 BPM | WEIGHT: 183 LBS

## 2024-05-06 DIAGNOSIS — E78.00 PURE HYPERCHOLESTEROLEMIA: ICD-10-CM

## 2024-05-06 DIAGNOSIS — R61 NIGHT SWEATS: ICD-10-CM

## 2024-05-06 DIAGNOSIS — I10 ESSENTIAL HYPERTENSION: Primary | ICD-10-CM

## 2024-05-06 DIAGNOSIS — I10 ESSENTIAL HYPERTENSION: ICD-10-CM

## 2024-05-06 PROCEDURE — 99214 OFFICE O/P EST MOD 30 MIN: CPT | Performed by: FAMILY MEDICINE

## 2024-05-06 RX ORDER — EZETIMIBE 10 MG/1
10 TABLET ORAL EVERY EVENING
Qty: 90 TABLET | Refills: 1 | Status: SHIPPED | OUTPATIENT
Start: 2024-05-06

## 2024-05-06 RX ORDER — LISINOPRIL 20 MG/1
20 TABLET ORAL DAILY
Qty: 90 TABLET | Refills: 1 | Status: SHIPPED | OUTPATIENT
Start: 2024-05-06

## 2024-05-06 RX ORDER — LISINOPRIL 20 MG/1
TABLET ORAL
Qty: 90 TABLET | Refills: 0 | OUTPATIENT
Start: 2024-05-06

## 2024-05-06 NOTE — TELEPHONE ENCOUNTER
Appt scheduled    Future Appointments   Date Time Provider Department Center   5/6/2024  4:00 PM Cathleen James MD [facility] [place]        Called Laura and she stated that she is going to contact the patient's  to call us and schedule that appt.

## 2024-05-06 NOTE — TELEPHONE ENCOUNTER
MCM sent to pt to schedule BP f/u appt.    Lisinopril 20 mg  Filled 2-6-24  Qty 90  0 refills  No future appointments.  LOV 8-10-23 TO

## 2024-05-06 NOTE — PROGRESS NOTES
Marco A Joshi is a 44 year old male.  HPI:   Here for BP check but for the last 3 d has had hot flashes    Does not feel sick       no aches  no sore throat      no cough   does not feel like coming down w something      Goes away after few minutes      no BP cks at the time       does exercise regularly    no neck pain    Breathing is good  No CP      no BP cks when he has the s/s          Current Outpatient Medications   Medication Sig Dispense Refill    ezetimibe 10 MG Oral Tab TAKE 1 TABLET BY MOUTH EVERY EVENING 90 tablet 0    finasteride 1 MG Oral Tab Take 1 tablet (1 mg total) by mouth daily. 90 tablet 1    LISINOPRIL 20 MG Oral Tab TAKE 1 TABLET BY MOUTH DAILY 90 tablet 0    ALPRAZolam 0.25 MG Oral Tab Take 1 tablet (0.25 mg total) by mouth nightly as needed for Sleep. 30 tablet 1    clotrimazole-betamethasone 1-0.05 % External Cream Apply 1 Application topically 2 (two) times daily as needed. 60 g 0    loratadine 10 MG Oral Tab Take 1 tablet (10 mg total) by mouth daily.      Minoxidil 2 % External Solution Apply topically 2 (two) times daily. (Patient not taking: Reported on 8/10/2023)  0      Past Medical History:    Chest pain    nl cath    Essential hypertension    Hyperlipidemia      Social History:  Social History     Socioeconomic History    Marital status: Single   Tobacco Use    Smoking status: Former     Current packs/day: 0.00     Types: Cigarettes     Start date: 10/30/1992     Quit date: 10/30/2018     Years since quittin.5    Smokeless tobacco: Never    Tobacco comments:     Pt states it is on and off he smokes   Vaping Use    Vaping status: Never Used   Substance and Sexual Activity    Alcohol use: Yes     Alcohol/week: 0.0 - 2.0 standard drinks of alcohol     Comment: occ    Drug use: Yes     Types: Cannabis     Comment: occ - edibles   Other Topics Concern    Caffeine Concern Yes     Comment: 2 oz daily    Exercise No        REVIEW OF SYSTEMS:   GENERAL HEALTH: feels well  otherwise  SKIN: denies rashes  RESPIRATORY: denies shortness    CARDIOVASCULAR: denies chest pain   GI: denies abdominal pain  NEURO: denies headaches    EXAM:   /80 (BP Location: Left arm, Patient Position: Sitting, Cuff Size: adult)   Pulse 75   Temp 98 °F (36.7 °C) (Temporal)   Ht 5' 5\" (1.651 m)   Wt 183 lb (83 kg)   SpO2 98%   BMI 30.45 kg/m²   GENERAL: well developed, well nourished,in no apparent distress  SKIN: no rashes  NECK: supple,no adenopathy  LUNGS: clear to auscultation  CARDIO: RRR without murmur  EXTREMITIES: no edema    ASSESSMENT AND PLAN:   1. Essential hypertension  BP is good w meds  CPM  refiled      aston 6 mo      - CBC With Differential With Platelet; Future  - Comp Metabolic Panel (14); Future  - Assay, Thyroid Stim Hormone; Future  - lisinopril 20 MG Oral Tab; Take 1 tablet (20 mg total) by mouth daily.  Dispense: 90 tablet; Refill: 1    2. Night sweats  Last few days     may be d/t illness     rec  check BP when occurs     check labs    Low BS?     Not convinced has something bad going on     - CBC With Differential With Platelet; Future  - Comp Metabolic Panel (14); Future  - Assay, Thyroid Stim Hormone; Future    3. Pure hypercholesterolemia  Meds filled      - ezetimibe 10 MG Oral Tab; Take 1 tablet (10 mg total) by mouth every evening.  Dispense: 90 tablet; Refill: 1     The patient indicates understanding of these issues and agrees to the plan.  .

## 2024-05-07 ENCOUNTER — LAB ENCOUNTER (OUTPATIENT)
Dept: LAB | Age: 44
End: 2024-05-07
Attending: FAMILY MEDICINE
Payer: COMMERCIAL

## 2024-05-07 DIAGNOSIS — I10 ESSENTIAL HYPERTENSION: ICD-10-CM

## 2024-05-07 DIAGNOSIS — R61 NIGHT SWEATS: ICD-10-CM

## 2024-05-07 LAB
ALBUMIN SERPL-MCNC: 4.5 G/DL (ref 3.2–4.8)
ALBUMIN/GLOB SERPL: 1.7 {RATIO} (ref 1–2)
ALP LIVER SERPL-CCNC: 50 U/L
ALT SERPL-CCNC: 35 U/L
ANION GAP SERPL CALC-SCNC: 7 MMOL/L (ref 0–18)
AST SERPL-CCNC: 31 U/L (ref ?–34)
BASOPHILS # BLD AUTO: 0.04 X10(3) UL (ref 0–0.2)
BASOPHILS NFR BLD AUTO: 0.9 %
BILIRUB SERPL-MCNC: 0.6 MG/DL (ref 0.3–1.2)
BUN BLD-MCNC: 13 MG/DL (ref 9–23)
BUN/CREAT SERPL: 12.9 (ref 10–20)
CALCIUM BLD-MCNC: 9.4 MG/DL (ref 8.7–10.4)
CHLORIDE SERPL-SCNC: 105 MMOL/L (ref 98–112)
CO2 SERPL-SCNC: 27 MMOL/L (ref 21–32)
CREAT BLD-MCNC: 1.01 MG/DL
DEPRECATED RDW RBC AUTO: 35.5 FL (ref 35.1–46.3)
EGFRCR SERPLBLD CKD-EPI 2021: 95 ML/MIN/1.73M2 (ref 60–?)
EOSINOPHIL # BLD AUTO: 0.13 X10(3) UL (ref 0–0.7)
EOSINOPHIL NFR BLD AUTO: 3.1 %
ERYTHROCYTE [DISTWIDTH] IN BLOOD BY AUTOMATED COUNT: 11.6 % (ref 11–15)
FASTING STATUS PATIENT QL REPORTED: YES
GLOBULIN PLAS-MCNC: 2.6 G/DL (ref 2–3.5)
GLUCOSE BLD-MCNC: 95 MG/DL (ref 70–99)
HCT VFR BLD AUTO: 46.8 %
HGB BLD-MCNC: 16.3 G/DL
IMM GRANULOCYTES # BLD AUTO: 0.02 X10(3) UL (ref 0–1)
IMM GRANULOCYTES NFR BLD: 0.5 %
LYMPHOCYTES # BLD AUTO: 1.15 X10(3) UL (ref 1–4)
LYMPHOCYTES NFR BLD AUTO: 27 %
MCH RBC QN AUTO: 29.5 PG (ref 26–34)
MCHC RBC AUTO-ENTMCNC: 34.8 G/DL (ref 31–37)
MCV RBC AUTO: 84.8 FL
MONOCYTES # BLD AUTO: 0.26 X10(3) UL (ref 0.1–1)
MONOCYTES NFR BLD AUTO: 6.1 %
NEUTROPHILS # BLD AUTO: 2.66 X10 (3) UL (ref 1.5–7.7)
NEUTROPHILS # BLD AUTO: 2.66 X10(3) UL (ref 1.5–7.7)
NEUTROPHILS NFR BLD AUTO: 62.4 %
OSMOLALITY SERPL CALC.SUM OF ELEC: 288 MOSM/KG (ref 275–295)
PLATELET # BLD AUTO: 202 10(3)UL (ref 150–450)
POTASSIUM SERPL-SCNC: 3.9 MMOL/L (ref 3.5–5.1)
PROT SERPL-MCNC: 7.1 G/DL (ref 5.7–8.2)
RBC # BLD AUTO: 5.52 X10(6)UL
SODIUM SERPL-SCNC: 139 MMOL/L (ref 136–145)
TSI SER-ACNC: 1.37 MIU/ML (ref 0.55–4.78)
WBC # BLD AUTO: 4.3 X10(3) UL (ref 4–11)

## 2024-05-07 PROCEDURE — 80053 COMPREHEN METABOLIC PANEL: CPT

## 2024-05-07 PROCEDURE — 85025 COMPLETE CBC W/AUTO DIFF WBC: CPT

## 2024-05-07 PROCEDURE — 36415 COLL VENOUS BLD VENIPUNCTURE: CPT

## 2024-05-07 PROCEDURE — 84443 ASSAY THYROID STIM HORMONE: CPT

## 2024-06-26 ENCOUNTER — OFFICE VISIT (OUTPATIENT)
Dept: INTERNAL MEDICINE CLINIC | Facility: CLINIC | Age: 44
End: 2024-06-26

## 2024-06-26 VITALS
BODY MASS INDEX: 30.32 KG/M2 | HEART RATE: 74 BPM | OXYGEN SATURATION: 96 % | DIASTOLIC BLOOD PRESSURE: 62 MMHG | SYSTOLIC BLOOD PRESSURE: 120 MMHG | RESPIRATION RATE: 16 BRPM | WEIGHT: 182 LBS | HEIGHT: 65 IN | TEMPERATURE: 97 F

## 2024-06-26 DIAGNOSIS — H60.501 ACUTE OTITIS EXTERNA OF RIGHT EAR, UNSPECIFIED TYPE: Primary | ICD-10-CM

## 2024-06-26 PROCEDURE — 99213 OFFICE O/P EST LOW 20 MIN: CPT | Performed by: FAMILY MEDICINE

## 2024-06-26 RX ORDER — NEOMYCIN SULFATE, POLYMYXIN B SULFATE AND HYDROCORTISONE 10; 3.5; 1 MG/ML; MG/ML; [USP'U]/ML
4 SUSPENSION/ DROPS AURICULAR (OTIC) 3 TIMES DAILY
Qty: 1 EACH | Refills: 0 | Status: SHIPPED | OUTPATIENT
Start: 2024-06-26 | End: 2024-07-03

## 2024-06-26 NOTE — PROGRESS NOTES
CHIEF COMPLAINT:     Chief Complaint   Patient presents with    Ear Problem     R ear, pt states used q-tip and hurt. States later on had blood and pain. Now has jaw pain.        HPI:   Marco A Joshi is a non-toxic, well appearing 44 year old male for complaints of right ear pain. Has had for  4-5   days.  Parent/Patient denies history of ear infections. Home treatment includes none.  Parent/Patient denies decreased hearing.  Parent/Patient denies drainage. Patient did have blood though. Patient was using a Q-tip and he accidentally hit the ear drum which caused pain and bleeding. That happened Saturday morning and then later that day he went swimming which also irritated the ear. Pt states his ears always feel itchy so uses lotion on the inner part of the ear. Parent/Patient reports use of Q-tips to clean the ears.  Patient/parent denies recent upper respiratory symptoms. Patient/parent denies fever.     Current Outpatient Medications   Medication Sig Dispense Refill    neomycin-polymyxin-hydrocortisone 3.5-42260-1 Otic Suspension Place 4 drops into the right ear 3 (three) times daily for 7 days. 1 each 0    lisinopril 20 MG Oral Tab Take 1 tablet (20 mg total) by mouth daily. 90 tablet 1    ezetimibe 10 MG Oral Tab Take 1 tablet (10 mg total) by mouth every evening. 90 tablet 1    finasteride 1 MG Oral Tab Take 1 tablet (1 mg total) by mouth daily. 90 tablet 1    ALPRAZolam 0.25 MG Oral Tab Take 1 tablet (0.25 mg total) by mouth nightly as needed for Sleep. 30 tablet 1    clotrimazole-betamethasone 1-0.05 % External Cream Apply 1 Application topically 2 (two) times daily as needed. 60 g 0    Minoxidil 2 % External Solution Apply topically 2 (two) times daily.  0    loratadine 10 MG Oral Tab Take 1 tablet (10 mg total) by mouth daily.        Past Medical History:    Chest pain    nl cath    Essential hypertension    Hyperlipidemia      Social History:  Social History     Socioeconomic History    Marital status:  Single   Tobacco Use    Smoking status: Former     Current packs/day: 0.00     Types: Cigarettes     Start date: 10/30/1992     Quit date: 10/30/2018     Years since quittin.6    Smokeless tobacco: Never    Tobacco comments:     Pt states it is on and off he smokes   Vaping Use    Vaping status: Never Used   Substance and Sexual Activity    Alcohol use: Yes     Alcohol/week: 0.0 - 2.0 standard drinks of alcohol     Comment: occ    Drug use: Yes     Types: Cannabis     Comment: occ - edibles   Other Topics Concern    Caffeine Concern Yes     Comment: 2 oz daily    Exercise No        REVIEW OF SYSTEMS:   GENERAL:  normal activity level.  normal appetite.  no sleep disturbances.  SKIN: no unusual skin lesions or rashes  EYES: No scleral injection/erythema.  No eye discharge.   HENT: See HPI.   LUNGS: Denies shortness of breath, or wheezing.  GI: No N/V/C/D.  NEURO: denies headaches or gait disturbances    EXAM:   /62 (BP Location: Left arm, Patient Position: Sitting, Cuff Size: adult)   Pulse 74   Temp 97.3 °F (36.3 °C) (Temporal)   Resp 16   Ht 5' 5\" (1.651 m)   Wt 182 lb (82.6 kg)   SpO2 96%   BMI 30.29 kg/m²   GENERAL: well developed, well nourished,in no apparent distress  SKIN: no rashes,no suspicious lesions  HEAD: atraumatic, normocephalic  EYES: conjunctiva clear, EOM intact  EARS: Tragus tender on palpation on the right side only. External auditory canal redden and irritated on the right. A small sore is noted in the right canal. Right TM: gray, no bulging, no retraction,no effusion..  Left TM: gray, no bulging, no retraction,no effusion.  NOSE: nostrils patent, no nasal discharge, nasal mucosa pink and noninflamed  THROAT: oral mucosa pink, moist. Posterior pharynx is not erythematous or injected. No exudates.  NECK: supple, non-tender  LUNGS: clear to auscultation bilaterally, no wheezes or rhonchi. Breathing is non labored.  CARDIO: RRR without murmur  LYMPH: no lymphadenopathy.       ASSESSMENT AND PLAN:   Marco A Joshi is a 44 year old male who presents with:    ASSESSMENT:  Encounter Diagnosis   Name Primary?    Acute otitis externa of right ear, unspecified type Yes       PLAN: Meds as listed below.  Comfort measures as described in Patient Instructions    Meds & Refills for this Visit:  Requested Prescriptions     Signed Prescriptions Disp Refills    neomycin-polymyxin-hydrocortisone 3.5-63911-7 Otic Suspension 1 each 0     Sig: Place 4 drops into the right ear 3 (three) times daily for 7 days.         Risk and benefits of medication discussed. Stressed importance of completing full course of antibiotic.     Call or return if s/sx worsen, or do not improve .    Patient/Parent voiced understand and is in agreement with treatment plan.

## 2024-10-26 ENCOUNTER — LAB ENCOUNTER (OUTPATIENT)
Dept: LAB | Age: 44
End: 2024-10-26
Attending: FAMILY MEDICINE
Payer: COMMERCIAL

## 2024-10-26 ENCOUNTER — OFFICE VISIT (OUTPATIENT)
Dept: INTERNAL MEDICINE CLINIC | Facility: CLINIC | Age: 44
End: 2024-10-26
Payer: COMMERCIAL

## 2024-10-26 VITALS
OXYGEN SATURATION: 98 % | TEMPERATURE: 98 F | DIASTOLIC BLOOD PRESSURE: 80 MMHG | SYSTOLIC BLOOD PRESSURE: 118 MMHG | RESPIRATION RATE: 16 BRPM | HEIGHT: 65 IN | WEIGHT: 179 LBS | HEART RATE: 66 BPM | BODY MASS INDEX: 29.82 KG/M2

## 2024-10-26 DIAGNOSIS — I10 ESSENTIAL HYPERTENSION: ICD-10-CM

## 2024-10-26 DIAGNOSIS — Z00.00 WELLNESS EXAMINATION: ICD-10-CM

## 2024-10-26 DIAGNOSIS — K92.1 BLOOD IN STOOL: ICD-10-CM

## 2024-10-26 DIAGNOSIS — L65.9 HAIR THINNING: ICD-10-CM

## 2024-10-26 DIAGNOSIS — Z00.00 LABORATORY EXAM ORDERED AS PART OF ROUTINE GENERAL MEDICAL EXAMINATION: ICD-10-CM

## 2024-10-26 DIAGNOSIS — F41.9 ANXIETY: ICD-10-CM

## 2024-10-26 DIAGNOSIS — Z00.00 WELLNESS EXAMINATION: Primary | ICD-10-CM

## 2024-10-26 DIAGNOSIS — F51.01 PRIMARY INSOMNIA: ICD-10-CM

## 2024-10-26 DIAGNOSIS — E78.00 PURE HYPERCHOLESTEROLEMIA: ICD-10-CM

## 2024-10-26 LAB
ALBUMIN SERPL-MCNC: 4.7 G/DL (ref 3.2–4.8)
ALBUMIN/GLOB SERPL: 1.5 {RATIO} (ref 1–2)
ALP LIVER SERPL-CCNC: 60 U/L
ALT SERPL-CCNC: 46 U/L
ANION GAP SERPL CALC-SCNC: 6 MMOL/L (ref 0–18)
AST SERPL-CCNC: 30 U/L (ref ?–34)
BASOPHILS # BLD AUTO: 0.05 X10(3) UL (ref 0–0.2)
BASOPHILS NFR BLD AUTO: 1 %
BILIRUB SERPL-MCNC: 0.6 MG/DL (ref 0.3–1.2)
BILIRUB UR QL STRIP.AUTO: NEGATIVE
BUN BLD-MCNC: 15 MG/DL (ref 9–23)
CALCIUM BLD-MCNC: 10.1 MG/DL (ref 8.7–10.4)
CHLORIDE SERPL-SCNC: 105 MMOL/L (ref 98–112)
CHOLEST SERPL-MCNC: 219 MG/DL (ref ?–200)
CLARITY UR REFRACT.AUTO: CLEAR
CO2 SERPL-SCNC: 27 MMOL/L (ref 21–32)
CREAT BLD-MCNC: 1.04 MG/DL
EGFRCR SERPLBLD CKD-EPI 2021: 91 ML/MIN/1.73M2 (ref 60–?)
EOSINOPHIL # BLD AUTO: 0.16 X10(3) UL (ref 0–0.7)
EOSINOPHIL NFR BLD AUTO: 3.1 %
ERYTHROCYTE [DISTWIDTH] IN BLOOD BY AUTOMATED COUNT: 11.6 %
FASTING PATIENT LIPID ANSWER: YES
FASTING STATUS PATIENT QL REPORTED: YES
GLOBULIN PLAS-MCNC: 3.1 G/DL (ref 2–3.5)
GLUCOSE BLD-MCNC: 90 MG/DL (ref 70–99)
GLUCOSE UR STRIP.AUTO-MCNC: NORMAL MG/DL
HCT VFR BLD AUTO: 48.7 %
HDLC SERPL-MCNC: 44 MG/DL (ref 40–59)
HGB BLD-MCNC: 16.7 G/DL
IMM GRANULOCYTES # BLD AUTO: 0.02 X10(3) UL (ref 0–1)
IMM GRANULOCYTES NFR BLD: 0.4 %
KETONES UR STRIP.AUTO-MCNC: NEGATIVE MG/DL
LDLC SERPL CALC-MCNC: 147 MG/DL (ref ?–100)
LEUKOCYTE ESTERASE UR QL STRIP.AUTO: NEGATIVE
LYMPHOCYTES # BLD AUTO: 1.35 X10(3) UL (ref 1–4)
LYMPHOCYTES NFR BLD AUTO: 26.1 %
MCH RBC QN AUTO: 29.2 PG (ref 26–34)
MCHC RBC AUTO-ENTMCNC: 34.3 G/DL (ref 31–37)
MCV RBC AUTO: 85.1 FL
MONOCYTES # BLD AUTO: 0.43 X10(3) UL (ref 0.1–1)
MONOCYTES NFR BLD AUTO: 8.3 %
NEUTROPHILS # BLD AUTO: 3.16 X10 (3) UL (ref 1.5–7.7)
NEUTROPHILS # BLD AUTO: 3.16 X10(3) UL (ref 1.5–7.7)
NEUTROPHILS NFR BLD AUTO: 61.1 %
NITRITE UR QL STRIP.AUTO: NEGATIVE
NONHDLC SERPL-MCNC: 175 MG/DL (ref ?–130)
OSMOLALITY SERPL CALC.SUM OF ELEC: 286 MOSM/KG (ref 275–295)
PH UR STRIP.AUTO: 6.5 [PH] (ref 5–8)
PLATELET # BLD AUTO: 239 10(3)UL (ref 150–450)
POTASSIUM SERPL-SCNC: 4.3 MMOL/L (ref 3.5–5.1)
PROT SERPL-MCNC: 7.8 G/DL (ref 5.7–8.2)
PROT UR STRIP.AUTO-MCNC: NEGATIVE MG/DL
RBC # BLD AUTO: 5.72 X10(6)UL
RBC UR QL AUTO: NEGATIVE
SODIUM SERPL-SCNC: 138 MMOL/L (ref 136–145)
SP GR UR STRIP.AUTO: 1.02 (ref 1–1.03)
TRIGL SERPL-MCNC: 153 MG/DL (ref 30–149)
UROBILINOGEN UR STRIP.AUTO-MCNC: NORMAL MG/DL
VLDLC SERPL CALC-MCNC: 29 MG/DL (ref 0–30)
WBC # BLD AUTO: 5.2 X10(3) UL (ref 4–11)

## 2024-10-26 PROCEDURE — 85025 COMPLETE CBC W/AUTO DIFF WBC: CPT

## 2024-10-26 PROCEDURE — 99396 PREV VISIT EST AGE 40-64: CPT | Performed by: FAMILY MEDICINE

## 2024-10-26 PROCEDURE — 81003 URINALYSIS AUTO W/O SCOPE: CPT

## 2024-10-26 PROCEDURE — 36415 COLL VENOUS BLD VENIPUNCTURE: CPT

## 2024-10-26 PROCEDURE — 80061 LIPID PANEL: CPT

## 2024-10-26 PROCEDURE — 80053 COMPREHEN METABOLIC PANEL: CPT

## 2024-10-26 RX ORDER — FINASTERIDE 1 MG/1
1 TABLET, FILM COATED ORAL DAILY
Qty: 90 TABLET | Refills: 1 | Status: SHIPPED | OUTPATIENT
Start: 2024-10-26

## 2024-10-26 RX ORDER — EZETIMIBE 10 MG/1
10 TABLET ORAL EVERY EVENING
Qty: 90 TABLET | Refills: 1 | Status: SHIPPED | OUTPATIENT
Start: 2024-10-26

## 2024-10-26 RX ORDER — LISINOPRIL 20 MG/1
20 TABLET ORAL DAILY
Qty: 90 TABLET | Refills: 1 | Status: SHIPPED | OUTPATIENT
Start: 2024-10-26

## 2024-10-26 NOTE — PROGRESS NOTES
Marco A Joshi is a 44 year old male who presents for a complete physical exam.   HPI:       Wt Readings from Last 6 Encounters:   10/26/24 179 lb (81.2 kg)   06/26/24 182 lb (82.6 kg)   05/06/24 183 lb (83 kg)   02/23/24 184 lb 3.2 oz (83.6 kg)   12/09/23 182 lb 3.2 oz (82.6 kg)   08/10/23 179 lb 9.6 oz (81.5 kg)     Body mass index is 29.79 kg/m².     Cholesterol, Total (mg/dL)   Date Value   08/10/2023 200 (H)   08/31/2022 189   02/28/2022 239 (H)     CHOLESTEROL, TOTAL (mg/dL)   Date Value   08/11/2012 270 (H)   04/06/2011 235 (H)     HDL Cholesterol (mg/dL)   Date Value   08/10/2023 44   08/31/2022 42   02/28/2022 47     HDL CHOLESTEROL (mg/dL)   Date Value   08/11/2012 48   04/06/2011 47     LDL Cholesterol (mg/dL)   Date Value   08/10/2023 116 (H)   08/31/2022 111 (H)   02/28/2022 169 (H)     LDL-CHOLESTEROL (mg/dL (calc))   Date Value   08/11/2012 174 (H)   04/06/2011 149 (H)     AST (U/L)   Date Value   05/07/2024 31   08/10/2023 37   10/26/2022 34   08/11/2012 30   04/06/2011 37     ALT (U/L)   Date Value   05/07/2024 35   08/10/2023 54   10/26/2022 53   08/11/2012 51   04/06/2011 51      Current Outpatient Medications   Medication Sig Dispense Refill    lisinopril 20 MG Oral Tab Take 1 tablet (20 mg total) by mouth daily. 90 tablet 1    ezetimibe 10 MG Oral Tab Take 1 tablet (10 mg total) by mouth every evening. 90 tablet 1    finasteride 1 MG Oral Tab Take 1 tablet (1 mg total) by mouth daily. 90 tablet 1    ALPRAZolam 0.25 MG Oral Tab Take 1 tablet (0.25 mg total) by mouth nightly as needed for Sleep. 30 tablet 1    clotrimazole-betamethasone 1-0.05 % External Cream Apply 1 Application topically 2 (two) times daily as needed. 60 g 0    Minoxidil 2 % External Solution Apply topically 2 (two) times daily.  0    loratadine 10 MG Oral Tab Take 1 tablet (10 mg total) by mouth daily.        Past Medical History:    Chest pain    nl cath    Essential hypertension    Hyperlipidemia      Past Surgical History:    Procedure Laterality Date    Cardiac catherterization (pbp)  2016    nl     Other surgical history  2007    PRK SX FIR EYES    Revision of lower eyelid  2003    resection      Family History   Problem Relation Age of Onset    Other (essential HTN) Father       Social History:  Social History     Socioeconomic History    Marital status: Single   Tobacco Use    Smoking status: Former     Current packs/day: 0.00     Types: Cigarettes     Start date: 10/30/1992     Quit date: 10/30/2018     Years since quittin.9    Smokeless tobacco: Never    Tobacco comments:     Pt states it is on and off he smokes   Vaping Use    Vaping status: Never Used   Substance and Sexual Activity    Alcohol use: Yes     Alcohol/week: 0.0 - 2.0 standard drinks of alcohol     Comment: occ    Drug use: Yes     Types: Cannabis     Comment: occ - edibles   Other Topics Concern    Caffeine Concern Yes     Comment: 2 oz daily    Exercise No      .        REVIEW OF SYSTEMS:   GENERAL: feels well otherwise  SKIN: denies rash  HEENT: had a cold    is better now   no fever    some chills  had phlegm    LUNGS: denies shortness of breath  CARDIOVASCULAR: denies chest pain on exertion   runs for exercise   GI: denies abdominal pain   occ gets blood after wiping after hard BM    can be sore     : denies dysuria  NEURO: denies headaches  PSYCHE: denies depression or anxiety  HEMATOLOGIC: denies hx of anemia  ENDOCRINE: denies thyroid history  ALL/ASTHMA: denies hx of allergy or asthma    EXAM:   /80 (BP Location: Left arm, Patient Position: Sitting, Cuff Size: adult)   Pulse 66   Temp 97.9 °F (36.6 °C) (Temporal)   Resp 16   Ht 5' 5\" (1.651 m)   Wt 179 lb (81.2 kg)   SpO2 98%   BMI 29.79 kg/m²   Body mass index is 29.79 kg/m².   GENERAL: well developed, well nourished,in no apparent distress  SKIN: no rashes,  HEENT: atraumatic, normocephalic,ears and throat are clear  NECK: supple,no adenopathy  LUNGS: clear to  auscultation  CARDIO: RRR without murmur  GI: good BS's,no masses, HSM or tenderness  : two descended testes,no masses,no hernia,no penile lesions  RECTAL  small hemorrhoid noted   EXTREMITIES: no edema  NEURO: motor and sensory are grossly intact    ASSESSMENT AND PLAN:   Marco A Joshi is a 44 year old male who presents for a complete physical exam.  Health maintenance, will check fasting Lipids, CMP, CBC and UA.   Glad he is better w the URI    discusssed BPR    suspect hemorrhoid related     rec matamucil daily    dr Garner info given     The patient indicates understanding of these issues and agrees to the plan.  The patient is asked to return for CPX in 1yr.

## 2024-11-05 DIAGNOSIS — E78.00 PURE HYPERCHOLESTEROLEMIA: Primary | ICD-10-CM

## 2024-11-05 RX ORDER — ROSUVASTATIN CALCIUM 5 MG/1
5 TABLET, COATED ORAL NIGHTLY
Qty: 90 TABLET | Refills: 0 | Status: SHIPPED | OUTPATIENT
Start: 2024-11-05

## 2025-01-30 DIAGNOSIS — E78.00 PURE HYPERCHOLESTEROLEMIA: ICD-10-CM

## 2025-01-30 RX ORDER — ROSUVASTATIN CALCIUM 5 MG/1
5 TABLET, COATED ORAL NIGHTLY
Qty: 90 TABLET | Refills: 0 | OUTPATIENT
Start: 2025-01-30

## 2025-02-03 DIAGNOSIS — E78.00 PURE HYPERCHOLESTEROLEMIA: ICD-10-CM

## 2025-02-03 RX ORDER — ROSUVASTATIN CALCIUM 5 MG/1
5 TABLET, COATED ORAL NIGHTLY
Qty: 30 TABLET | Refills: 0 | Status: SHIPPED | OUTPATIENT
Start: 2025-02-03

## 2025-02-24 DIAGNOSIS — F41.9 ANXIETY: ICD-10-CM

## 2025-02-24 RX ORDER — ALPRAZOLAM 0.25 MG
0.25 TABLET ORAL NIGHTLY PRN
Qty: 30 TABLET | Refills: 0 | Status: SHIPPED | OUTPATIENT
Start: 2025-02-24

## 2025-02-24 NOTE — TELEPHONE ENCOUNTER
Requesting     Name from pharmacy: ALPRAZOLAM 0.25MG TABLETS          Will file in chart as: ALPRAZOLAM 0.25 MG Oral Tab    Sig: TAKE 1 TABLET(0.25 MG) BY MOUTH EVERY NIGHT AS NEEDED FOR SLEEP    Disp: 30 tablet    Refills: 0 (Pharmacy requested: Not specified)    Start: 2/24/2025    Class: Normal    Non-formulary For: Anxiety    Last ordered: 2 years ago (2/24/2023) by Cathleen James MD    Last refill: 2/24/2023    Rx #: [id number]    Controlled Substance Medication [id number]/24/2025 01:54 PM    This medication is a controlled substance - forward to provider to refill    Medication is active on med list        LOV: 10/26/2024  RTC: 1 year   Last Relevant Labs: n/a   Filled: 2/24/2023 #30 with 1 refills    No future appointments.

## 2025-04-04 ENCOUNTER — OFFICE VISIT (OUTPATIENT)
Dept: INTERNAL MEDICINE CLINIC | Facility: CLINIC | Age: 45
End: 2025-04-04
Payer: COMMERCIAL

## 2025-04-04 VITALS
HEART RATE: 95 BPM | HEIGHT: 65 IN | WEIGHT: 174 LBS | OXYGEN SATURATION: 96 % | TEMPERATURE: 100 F | DIASTOLIC BLOOD PRESSURE: 60 MMHG | BODY MASS INDEX: 28.99 KG/M2 | SYSTOLIC BLOOD PRESSURE: 102 MMHG

## 2025-04-04 DIAGNOSIS — J18.9 PNEUMONIA OF LEFT LOWER LOBE DUE TO INFECTIOUS ORGANISM: ICD-10-CM

## 2025-04-04 DIAGNOSIS — B34.9 VIRAL SYNDROME: Primary | ICD-10-CM

## 2025-04-04 PROCEDURE — 99213 OFFICE O/P EST LOW 20 MIN: CPT | Performed by: FAMILY MEDICINE

## 2025-04-04 RX ORDER — AZITHROMYCIN 250 MG/1
TABLET, FILM COATED ORAL
Qty: 6 TABLET | Refills: 0 | Status: SHIPPED | OUTPATIENT
Start: 2025-04-04 | End: 2025-04-09

## 2025-04-04 NOTE — PROGRESS NOTES
HPI:   Marco A Joshi is a 45 year old male who presents for upper respiratory symptoms for  5  days.     Started w body aches   felt hot    no sore throat  slight cough      had HA in the beginning     ears OK  no nvd     no meds used      no flu or covid vaccine    not cked for covid     did not sleep well last night       friend had similar illness       He was feeling alittle better but s/s increased again    Current Outpatient Medications   Medication Sig Dispense Refill    ALPRAZOLAM 0.25 MG Oral Tab TAKE 1 TABLET(0.25 MG) BY MOUTH EVERY NIGHT AS NEEDED FOR SLEEP 30 tablet 0    finasteride 1 MG Oral Tab Take 1 tablet (1 mg total) by mouth daily. 90 tablet 1    lisinopril 20 MG Oral Tab Take 1 tablet (20 mg total) by mouth daily. 90 tablet 1    ezetimibe 10 MG Oral Tab Take 1 tablet (10 mg total) by mouth every evening. 90 tablet 1    clotrimazole-betamethasone 1-0.05 % External Cream Apply 1 Application topically 2 (two) times daily as needed. 60 g 0    Minoxidil 2 % External Solution Apply topically 2 (two) times daily.  0    loratadine 10 MG Oral Tab Take 1 tablet (10 mg total) by mouth daily.      rosuvastatin 5 MG Oral Tab Take 1 tablet (5 mg total) by mouth nightly. (Patient not taking: Reported on 2025) 30 tablet 0      Past Medical History:    Chest pain    nl cath    Essential hypertension    Hyperlipidemia      Past Surgical History:   Procedure Laterality Date    Cardiac catherterization (pbp)  2016    nl     Other surgical history      PRK SX FIR EYES    Revision of lower eyelid  2003    resection      Family History   Problem Relation Age of Onset    Other (essential HTN) Father       Social History     Socioeconomic History    Marital status: Single   Tobacco Use    Smoking status: Former     Current packs/day: 0.00     Types: Cigarettes     Start date: 10/30/1992     Quit date: 10/30/2018     Years since quittin.4    Smokeless tobacco: Never    Tobacco comments:     Pt states  it is on and off he smokes   Vaping Use    Vaping status: Never Used   Substance and Sexual Activity    Alcohol use: Yes     Alcohol/week: 0.0 - 2.0 standard drinks of alcohol     Comment: occ    Drug use: Yes     Types: Cannabis     Comment: occ - edibles   Other Topics Concern    Caffeine Concern Yes     Comment: 2 oz daily    Exercise No         REVIEW OF SYSTEMS:   GENERAL: feels well otherwise  SKIN: no rashes       EXAM:   /60 (BP Location: Left arm, Patient Position: Sitting, Cuff Size: adult)   Pulse 95   Temp 100.2 °F (37.9 °C) (Oral)   Ht 5' 5\" (1.651 m)   Wt 174 lb (78.9 kg)   SpO2 96%   BMI 28.96 kg/m²   GENERAL: well developed, well nourished,in no apparent distress  SKIN: no rashes  EYES:conj clear  HEENT: atraumatic, normocephalic,ears and throat are clear  NECK: supple,no adenopathy,  LUNGS: clear to auscultation but subtle LLL crackles noted         ASSESSMENT AND PLAN:   Marco A Joshi is a 45 year old male who presents with Viral Syndrome and now w LLL crackles    I suspect he has the flu, now w PNA    . PLAN:  will treat w zpak    rest  advil prn  hydrate    call w Monday    Note for work until 3/7/written  .  The patient indicates understanding of these issues and agrees to the plan.  The patient is asked to return if sx's persist or worsen.

## 2025-04-07 ENCOUNTER — NURSE TRIAGE (OUTPATIENT)
Dept: INTERNAL MEDICINE CLINIC | Facility: CLINIC | Age: 45
End: 2025-04-07

## 2025-04-07 ENCOUNTER — HOSPITAL ENCOUNTER (OUTPATIENT)
Dept: GENERAL RADIOLOGY | Age: 45
Discharge: HOME | End: 2025-04-07
Attending: FAMILY MEDICINE
Payer: COMMERCIAL

## 2025-04-07 DIAGNOSIS — R04.2 COUGH WITH HEMOPTYSIS: Primary | ICD-10-CM

## 2025-04-07 DIAGNOSIS — R04.2 COUGH WITH HEMOPTYSIS: ICD-10-CM

## 2025-04-07 PROCEDURE — 71046 X-RAY EXAM CHEST 2 VIEWS: CPT | Performed by: FAMILY MEDICINE

## 2025-04-07 NOTE — TELEPHONE ENCOUNTER
Action Requested: Summary for Provider     []  Critical Lab, Recommendations Needed  [] Need Additional Advice  [x]   FYI    []   Need Orders  [] Need Medications Sent to Pharmacy  []  Other     SUMMARY: TO: Please see below regarding episode of blood-tinged cough. Any additional advice for patient?     Spoke with patient regarding condition update. Patient states he is feeling better overall, cough has improved. Reports that during his shower this morning, had a coughing spell and coughed up a small amount of blood, less than a tablespoon. Has not had any more blood-tinged mucus since. This RN asked patient if he had any other symptoms. Denies shortness of breath, denies chest pain, denies back pain. Denies fever, chills, body aches. Patient states that other than the blood-tinged mucus, feels overall better. This RN advised patient to go to Urgent Care if any blood is seen in cough again. Also advised to go to  if symptoms worsen. Patient verbalized understanding.     Reason for call: No chief complaint on file.  Onset: Data Unavailable                         Feels better with z-jamir. Still coughing, has more energy. Coughing up blood with phlegm this morning in shower. Patient has not really cough  Reason for Disposition   Few streaks of blood mixed in with yellow or green sputum   Coughing up blood    Protocols used: Coughing Up Blood-A-OH

## 2025-04-07 NOTE — TELEPHONE ENCOUNTER
Left message to call back. Also sent MCM with recommendation per Dr. James as this patient was already triaged.     Cathleen James MD to [facility] Clinical Staff       4/7/25  3:46 PM  Rec CXR

## 2025-04-09 ENCOUNTER — TELEPHONE (OUTPATIENT)
Dept: INTERNAL MEDICINE CLINIC | Facility: CLINIC | Age: 45
End: 2025-04-09

## 2025-04-09 RX ORDER — LEVOFLOXACIN 500 MG/1
500 TABLET, FILM COATED ORAL DAILY
Qty: 10 TABLET | Refills: 0 | Status: SHIPPED | OUTPATIENT
Start: 2025-04-09

## 2025-04-09 NOTE — TELEPHONE ENCOUNTER
TO: Please see below and advise. Another antibiotic?    Spoke with patient regarding chest x-ray results per Dr. James. Patient states his last dose of abx (zpak) was yesterday. States he feels better in some ways, but reports he still sees a slight amount of blood in phlegm. Cold sweats/chills last few nights. Denies fever. Denies shortness of breath. Patient states he is hydrated. Recommended humidifier and to remain hydrated. Patient concerned about night sweats/chills.     Cathleen James MD  4/8/2025  4:35 PM CDT Back to Top      Pneumonia as noted on exam       On antibiotics      How is he doing?

## 2025-04-09 NOTE — TELEPHONE ENCOUNTER
Noted. Order placed, patient notified. Verbalized understanding.      Cathleen James MD to [facility] Clinical Staff      4/9/25 10:15 AM  Levaquin 500mg daily #10

## 2025-04-14 ENCOUNTER — OFFICE VISIT (OUTPATIENT)
Dept: INTERNAL MEDICINE CLINIC | Facility: CLINIC | Age: 45
End: 2025-04-14
Payer: COMMERCIAL

## 2025-04-14 VITALS
DIASTOLIC BLOOD PRESSURE: 80 MMHG | BODY MASS INDEX: 28.49 KG/M2 | HEART RATE: 77 BPM | TEMPERATURE: 98 F | WEIGHT: 171 LBS | SYSTOLIC BLOOD PRESSURE: 116 MMHG | OXYGEN SATURATION: 97 % | HEIGHT: 65 IN

## 2025-04-14 DIAGNOSIS — J18.9 PNEUMONIA OF LEFT LOWER LOBE DUE TO INFECTIOUS ORGANISM: Primary | ICD-10-CM

## 2025-04-14 PROCEDURE — 99213 OFFICE O/P EST LOW 20 MIN: CPT | Performed by: FAMILY MEDICINE

## 2025-04-14 NOTE — PROGRESS NOTES
Marco A Joshi is a 45 year old male.  HPI:   Here for f/u PNA   treated w zpak but did get bettter but still w cough but blood tinged     still had cold sweats     still w discoored mucous    blood is better      tired by end of day   did feel chilled last night   not SOB     did not start the levaquin yet        Current Medications[1]   Past Medical History[2]   Social History:  Short Social Hx on File[3]     REVIEW OF SYSTEMS:   GENERAL HEALTH: feels well otherwise       EXAM:   /80   Pulse 77   Temp 98 °F (36.7 °C) (Temporal)   Ht 5' 5\" (1.651 m)   Wt 171 lb (77.6 kg)   SpO2 97%   BMI 28.46 kg/m²   GENERAL: well developed, well nourished,in no apparent distress  SKIN: no rashes  TMs nl  throat nl   NECK: supple,no adenopathy  LUNGS: clear to auscultation except LLL rales        ASSESSMENT AND PLAN:    1. Pneumonia of left lower lobe due to infectious organism  Is overall better    hold off on the levaquin since getting better     call if s/s change       The patient indicates understanding of these issues and agrees to the plan.  .         [1]   Current Outpatient Medications   Medication Sig Dispense Refill    levoFLOXacin 500 MG Oral Tab Take 1 tablet (500 mg total) by mouth daily. (Patient not taking: Reported on 4/14/2025) 10 tablet 0    ALPRAZOLAM 0.25 MG Oral Tab TAKE 1 TABLET(0.25 MG) BY MOUTH EVERY NIGHT AS NEEDED FOR SLEEP 30 tablet 0    rosuvastatin 5 MG Oral Tab Take 1 tablet (5 mg total) by mouth nightly. (Patient not taking: Reported on 4/14/2025) 30 tablet 0    finasteride 1 MG Oral Tab Take 1 tablet (1 mg total) by mouth daily. 90 tablet 1    lisinopril 20 MG Oral Tab Take 1 tablet (20 mg total) by mouth daily. 90 tablet 1    ezetimibe 10 MG Oral Tab Take 1 tablet (10 mg total) by mouth every evening. 90 tablet 1    clotrimazole-betamethasone 1-0.05 % External Cream Apply 1 Application topically 2 (two) times daily as needed. 60 g 0    Minoxidil 2 % External Solution Apply topically 2  (two) times daily.  0    loratadine 10 MG Oral Tab Take 1 tablet (10 mg total) by mouth daily.     [2]   Past Medical History:   Chest pain    nl cath    Essential hypertension    Hyperlipidemia   [3]   Social History  Socioeconomic History    Marital status: Single   Tobacco Use    Smoking status: Former     Current packs/day: 0.00     Types: Cigarettes     Start date: 10/30/1992     Quit date: 10/30/2018     Years since quittin.4    Smokeless tobacco: Never    Tobacco comments:     Pt states it is on and off he smokes   Vaping Use    Vaping status: Never Used   Substance and Sexual Activity    Alcohol use: Yes     Alcohol/week: 0.0 - 2.0 standard drinks of alcohol     Comment: occ    Drug use: Yes     Types: Cannabis     Comment: occ - edibles   Other Topics Concern    Caffeine Concern Yes     Comment: 2 oz daily    Exercise No

## 2025-04-17 ENCOUNTER — PATIENT MESSAGE (OUTPATIENT)
Dept: INTERNAL MEDICINE CLINIC | Facility: CLINIC | Age: 45
End: 2025-04-17

## 2025-05-15 DIAGNOSIS — E78.00 PURE HYPERCHOLESTEROLEMIA: ICD-10-CM

## 2025-05-15 RX ORDER — EZETIMIBE 10 MG/1
10 TABLET ORAL EVERY EVENING
Qty: 90 TABLET | Refills: 0 | Status: SHIPPED | OUTPATIENT
Start: 2025-05-15

## 2025-05-15 NOTE — TELEPHONE ENCOUNTER
Requesting    Name from pharmacy: EZETIMIBE 10 MG TABLET         Will file in chart as: EZETIMIBE 10 MG Oral Tab    Sig: TAKE 1 TABLET BY MOUTH EVERY DAY IN THE EVENING    Disp: 90 tablet    Refills: 1    Start: 5/15/2025    Class: Normal    Non-formulary For: Pure hypercholesterolemia    To pharmacy: DX Code Needed  .    Last ordered: 6 months ago (10/26/2024) by Cathleen James MD    Last refill: 2/11/2025    Rx #: [id number]    Cholesterol Medication Protocol [id number]/15/2025 09:00 AM   Protocol Details ALT < 80    ALT resulted within past year    Lipid panel within past 12 months    In person appointment or virtual visit in the past 12 mos or appointment in next 3 mos    Medication is active on med list        LOV: 4/14/2025  RTC: none noted   Last Relevant Labs: 10/26/2024  Filled: 2/11/2025 #90 with 0 refills    No future appointments.

## 2025-06-28 DIAGNOSIS — I10 ESSENTIAL HYPERTENSION: ICD-10-CM

## 2025-06-30 RX ORDER — LISINOPRIL 20 MG/1
20 TABLET ORAL DAILY
Qty: 90 TABLET | Refills: 1 | Status: SHIPPED | OUTPATIENT
Start: 2025-06-30

## 2025-06-30 NOTE — TELEPHONE ENCOUNTER
Protocol: Passed  Last Office Visit: 4/14/25  Labs: Completed in October  Last Refill: 10/26/24 #90 1 Refill  Return to Clinic: No future appointments.

## 2025-07-08 DIAGNOSIS — L65.9 HAIR THINNING: ICD-10-CM

## 2025-07-08 RX ORDER — FINASTERIDE 1 MG/1
1 TABLET, FILM COATED ORAL DAILY
Qty: 90 TABLET | Refills: 1 | Status: SHIPPED | OUTPATIENT
Start: 2025-07-08

## 2025-07-14 ENCOUNTER — OFFICE VISIT (OUTPATIENT)
Dept: INTERNAL MEDICINE CLINIC | Facility: CLINIC | Age: 45
End: 2025-07-14
Payer: COMMERCIAL

## 2025-07-14 VITALS
HEIGHT: 65 IN | HEART RATE: 74 BPM | BODY MASS INDEX: 29.46 KG/M2 | WEIGHT: 176.81 LBS | OXYGEN SATURATION: 95 % | DIASTOLIC BLOOD PRESSURE: 72 MMHG | SYSTOLIC BLOOD PRESSURE: 120 MMHG | TEMPERATURE: 98 F

## 2025-07-14 DIAGNOSIS — S56.912A ELBOW STRAIN, LEFT, INITIAL ENCOUNTER: ICD-10-CM

## 2025-07-14 DIAGNOSIS — D22.9 BENIGN MOLE: ICD-10-CM

## 2025-07-14 DIAGNOSIS — Z87.19 H/O HEMORRHOIDS: ICD-10-CM

## 2025-07-14 DIAGNOSIS — K62.5 RECTAL BLEEDING: Primary | ICD-10-CM

## 2025-07-14 PROCEDURE — 99213 OFFICE O/P EST LOW 20 MIN: CPT | Performed by: FAMILY MEDICINE

## 2025-07-14 NOTE — PROGRESS NOTES
CHIEF COMPLAINT:     Chief Complaint   Patient presents with    Blood In Stool       HPI:   Marco A Joshi is a 45 year old male .  The patient complains of possible hemorrhoids. Pt thinks he had one last year and Dr. James gave him a referral to see GI and do a colonoscopy but Pt never went because he has a high deductible. Pt has been saving up money to do it and will call to schedule.. Recently has had blood with his stool again. Pt states the blood was on the stool,not in the stool. Pt does not feel a lump and there has not been any pain.  Pt was constipated and had a hard stool last week. That is when he noticed the blood. Pt has not had a issue since then. Denies any black stools. Denies any abdominal pain.    Pt has also been having left elbow pain for 1 month. Pt thinks he may have knocked it on the wall and then has continued to work out . Pt has noticed the elbow being sore. Pt has tried not to use it or lift on that side and it has been helping some. Pt has not been taking any medication for it.    Patient has noticed more sun spots on his face and would like them checked today. Pt does not always wear sunscreen.    Current Medications[1]   Past Medical History[2]   Social History:  Short Social Hx on File[3]     REVIEW OF SYSTEMS:   GENERAL: Denies fever, chills,weight change, decreased appetite  SKIN: Denies rashes, skin wounds or ulcers. See HPI  EYES: Denies blurred vision or double vision  HENT: Denies congestion, rhinorrhea, sore throat or ear pain  CHEST: Denies chest pain, or palpitations  LUNGS: Denies shortness of breath, cough, or wheezing  GI: Denies abdominal pain, N/V/C/D.   MUSCULOSKELETAL: see HPI  LYMPH:  Denies lymphadenopathy  NEURO: Denies headaches or lightheadedness      EXAM:   /72 (BP Location: Left arm, Patient Position: Sitting, Cuff Size: adult)   Pulse 74   Temp 97.9 °F (36.6 °C) (Temporal)   Ht 5' 5\" (1.651 m)   Wt 176 lb 12.8 oz (80.2 kg)   SpO2 95%   BMI 29.42  kg/m²   GENERAL: well developed, well nourished,in no apparent distress  SKIN: Face a few flat brown moles noted on the face/forehead area. One small brown raised mole near the temple area on the right is raised. No redness or drg.  HEAD: atraumatic, normocephalic  EYES: conjunctiva clear  NECK: supple, non-tender  LUNGS: clear to auscultation bilaterally, no wheezes or rhonchi. Breathing is non labored.  CARDIO: RRR without murmur  RECTAL: declined     Physical Exam    ASSESSMENT AND PLAN:     Encounter Diagnoses   Name Primary?    Rectal bleeding Yes    Elbow strain, left, initial encounter     Benign mole     H/O hemorrhoids        No orders of the defined types were placed in this encounter.      Meds & Refills for this Visit:  Requested Prescriptions      No prescriptions requested or ordered in this encounter       Imaging & Consults:  None    PLAN:  1. Rectal bleeding and 2. H/O hemorrhoids    Should also take a dose of metamucil daily.Importance of increasing dietary fiber like fruits and vegetables. Patient's intake discussed needs plenty of non-dehydrating fluids such as water.  Gastro for evaluation and probable colonoscopy    3. Elbow strain, left, initial encounter  Rest, ice, elevate. Elbow brace/support  Tylenol/Motrin for pain  Avoid heavy lifting, grasping and gripping  Pt to call if not improving. Pt may need to see Ortho for further evaluation.    3. Benign mole  Pt to continue to monitor moles. If increase in size,color or irregular shape occurs will refer to Derm.      The patient indicates understanding of these issues and agrees to the plan.  The patient is asked to return if not improving.             [1]   Current Outpatient Medications   Medication Sig Dispense Refill    FINASTERIDE 1 MG Oral Tab TAKE 1 TABLET BY MOUTH EVERY DAY 90 tablet 1    LISINOPRIL 20 MG Oral Tab TAKE 1 TABLET BY MOUTH EVERY DAY 90 tablet 1    ezetimibe 10 MG Oral Tab TAKE 1 TABLET BY MOUTH EVERY DAY IN THE EVENING 90  tablet 0    levoFLOXacin 500 MG Oral Tab Take 1 tablet (500 mg total) by mouth daily. (Patient not taking: Reported on 2025) 10 tablet 0    ALPRAZOLAM 0.25 MG Oral Tab TAKE 1 TABLET(0.25 MG) BY MOUTH EVERY NIGHT AS NEEDED FOR SLEEP 30 tablet 0    rosuvastatin 5 MG Oral Tab Take 1 tablet (5 mg total) by mouth nightly. (Patient not taking: Reported on 2025) 30 tablet 0    clotrimazole-betamethasone 1-0.05 % External Cream Apply 1 Application topically 2 (two) times daily as needed. 60 g 0    Minoxidil 2 % External Solution Apply topically 2 (two) times daily.  0    loratadine 10 MG Oral Tab Take 1 tablet (10 mg total) by mouth daily. (Patient not taking: Reported on 2025)     [2]   Past Medical History:   Chest pain    nl cath    Essential hypertension    Hyperlipidemia   [3]   Social History  Socioeconomic History    Marital status: Single   Tobacco Use    Smoking status: Former     Current packs/day: 0.00     Types: Cigarettes     Start date: 10/30/1992     Quit date: 10/30/2018     Years since quittin.7    Smokeless tobacco: Never    Tobacco comments:     Pt states it is on and off he smokes   Vaping Use    Vaping status: Never Used   Substance and Sexual Activity    Alcohol use: Yes     Alcohol/week: 0.0 - 2.0 standard drinks of alcohol     Comment: occ    Drug use: Yes     Types: Cannabis     Comment: occ - edibles   Other Topics Concern    Caffeine Concern Yes     Comment: 2 oz daily    Exercise No

## 2025-07-17 ENCOUNTER — TELEPHONE (OUTPATIENT)
Dept: INTERNAL MEDICINE CLINIC | Facility: CLINIC | Age: 45
End: 2025-07-17

## 2025-07-17 NOTE — TELEPHONE ENCOUNTER
Pt called inquiring about referral he was given for GASTROENTEROLOGY back on 10/26/24. He called to try to make an appointment for Dr Zayas and was told because this may not be preventative until he turns 46, and if he gets it sooner he will have to pay out of pocket due to him being on a high deductible insurance plan. He is asking if it is important for him to get right now or can he wait until next year when he turns 46. If so can he receive another referral that's post dated for next year.     Please advise   Callback [phone]. Thank you

## 2025-07-17 NOTE — TELEPHONE ENCOUNTER
TO: Patient was given referral for gastroenterology from October of 2024. He was told when scheduling his appt with Dr. Zayas he might have to pay out of pocket due to not being at average age risk. Should patient wait till he's 46? Please advise. Ty

## 2025-08-12 DIAGNOSIS — E78.00 PURE HYPERCHOLESTEROLEMIA: ICD-10-CM

## 2025-08-12 RX ORDER — EZETIMIBE 10 MG/1
10 TABLET ORAL EVERY EVENING
Qty: 30 TABLET | Refills: 0 | Status: SHIPPED | OUTPATIENT
Start: 2025-08-12

## (undated) DIAGNOSIS — E55.9 VITAMIN D DEFICIENCY: ICD-10-CM

## (undated) DIAGNOSIS — E78.00 PURE HYPERCHOLESTEROLEMIA: Primary | ICD-10-CM

## (undated) NOTE — LETTER
Date: 4/4/2025    Patient Name: Marco A Joshi          To Whom it may concern:    The above patient was seen at [facility] for treatment of a medical condition.    This patient should be excused from attending work until 4/7/25.          Sincerely,      Cathleen James MD

## (undated) NOTE — LETTER
Date: 9/10/2018    Patient Name: Sandy Parra          To Whom it may concern: The above patient was seen at the [facility] for treatment of a medical condition. This patient should be excused from attending work on 9/10/18.     The

## (undated) NOTE — LETTER
Date: 11/11/2020      Patient Name: Tisha Flaherty      To Whom it may concern: This letter has been written at the patient's request. The above patient was treated by the [facility] for his current COVID infection.    [facility]

## (undated) NOTE — LETTER
April 13, 2020    Patient: Mya Guajardo   YOB: 1980     Dear Employer,  At [facility], we are taking special precautions and doing everything we can to prevent the spread of COVID-19 (coronavirus disease 2019).  During this immunosuppressed status due to disease or medication, chronic respiratory or heart conditions and diabetes). ?  During the social distancing period imposed by the [place] in March, any employee who can be isolated at home and avoid exposure to th

## (undated) NOTE — MR AVS SNAPSHOT
[place]  [address] [address]  [address] [facility] [address] [phone]               Thank you for choosing us for your health care visit with Everitt Cogan, NP.   We are glad to serve you and happy to provide you with this sum Take two tablets by mouth today, then one tablet daily. Commonly known as:  ZITHROMAX Z-LD           clotrimazole-betamethasone 1-0.05 % Crea   Apply 1 Application topically 2 (two) times daily as needed.    Commonly known as:  [place]ell Fraction Make half your plate fruits and vegetables Highly refined, white starches including white bread, rice and pasta   Eat plenty of protein, keep the fat content low Sugars:  sodas and sports drinks, candies and desserts   Eat plenty of low-fat dairy products

## (undated) NOTE — LETTER
11/15/18        [address]      Dear Dana Cheney,    [facility] records indicate that you have outstanding lab work and or testing that was ordered for you and has not yet been completed:  Orders Placed This Encounter      LIPID PANEL